# Patient Record
Sex: MALE | Race: WHITE | Employment: UNEMPLOYED | RURAL
[De-identification: names, ages, dates, MRNs, and addresses within clinical notes are randomized per-mention and may not be internally consistent; named-entity substitution may affect disease eponyms.]

---

## 2019-01-08 ENCOUNTER — OFFICE VISIT (OUTPATIENT)
Dept: FAMILY MEDICINE CLINIC | Age: 59
End: 2019-01-08

## 2019-01-08 VITALS
DIASTOLIC BLOOD PRESSURE: 70 MMHG | HEART RATE: 80 BPM | BODY MASS INDEX: 24.77 KG/M2 | OXYGEN SATURATION: 98 % | HEIGHT: 70 IN | WEIGHT: 173 LBS | TEMPERATURE: 97.7 F | SYSTOLIC BLOOD PRESSURE: 100 MMHG | RESPIRATION RATE: 18 BRPM

## 2019-01-08 DIAGNOSIS — F41.8 ANXIETY WITH DEPRESSION: ICD-10-CM

## 2019-01-08 DIAGNOSIS — R10.13 DYSPEPSIA: ICD-10-CM

## 2019-01-08 DIAGNOSIS — F41.0 PANIC ATTACKS: ICD-10-CM

## 2019-01-08 DIAGNOSIS — Z76.89 ENCOUNTER TO ESTABLISH CARE: ICD-10-CM

## 2019-01-08 DIAGNOSIS — J06.9 URI WITH COUGH AND CONGESTION: Primary | ICD-10-CM

## 2019-01-08 RX ORDER — TRAZODONE HYDROCHLORIDE 50 MG/1
50 TABLET ORAL
COMMUNITY
End: 2019-04-08 | Stop reason: DRUGHIGH

## 2019-01-08 RX ORDER — HYDROGEN PEROXIDE 3 %
20 SOLUTION, NON-ORAL MISCELLANEOUS DAILY
COMMUNITY

## 2019-01-08 RX ORDER — CITALOPRAM 20 MG/1
20 TABLET, FILM COATED ORAL DAILY
COMMUNITY
End: 2019-04-08 | Stop reason: SDUPTHER

## 2019-01-08 RX ORDER — LORAZEPAM 2 MG/1
2 TABLET ORAL 2 TIMES DAILY
COMMUNITY
End: 2019-01-08 | Stop reason: SDUPTHER

## 2019-01-08 RX ORDER — LORAZEPAM 2 MG/1
2 TABLET ORAL 2 TIMES DAILY
Qty: 60 TAB | Refills: 2 | Status: SHIPPED | OUTPATIENT
Start: 2019-01-08 | End: 2019-04-08 | Stop reason: SDUPTHER

## 2019-01-08 RX ORDER — AZITHROMYCIN 250 MG/1
TABLET, FILM COATED ORAL
Qty: 6 TAB | Refills: 0 | Status: SHIPPED | OUTPATIENT
Start: 2019-01-08 | End: 2019-01-13

## 2019-01-08 NOTE — PROGRESS NOTES
Identified pt with two pt identifiers(name and ). Chief Complaint Patient presents with [address] [place] [address] [address] PCP [address]. Bernardo Gabriel at [facility] ([address])  Fatigue Symptoms began about 5 weeks ago  Nasal Congestion  Cough Health Maintenance Due Topic  Hepatitis C Screening  DTaP/Tdap/Td series (1 - Tdap)  Shingrix Vaccine Age 50> (1 of 2)  FOBT Q 1 YEAR AGE 50-75 Wt Readings from Last 3 Encounters:  
19 173 lb (78.5 kg) 16 163 lb (73.9 kg) Temp Readings from Last 3 Encounters:  
19 97.7 °F (36.5 °C) (Oral) 16 97.6 °F (36.4 °C) BP Readings from Last 3 Encounters:  
19 100/70  
16 120/72 Pulse Readings from Last 3 Encounters:  
19 80  
16 70 Learning Assessment: 
:  
 
Learning Assessment 2019 PRIMARY LEARNER Patient PRIMARY LANGUAGE ENGLISH  
LEARNER PREFERENCE PRIMARY DEMONSTRATION  
ANSWERED BY self RELATIONSHIP SELF Depression Screening: 
:  
 
PHQ over the last two weeks 2019 PHQ Not Done Active Diagnosis of Depression or Bipolar Disorder Fall Risk Assessment: 
:  
 
No flowsheet data found. Abuse Screening: 
:  
 
Abuse Screening Questionnaire 2019 Do you ever feel afraid of your partner? Iva Starmario Are you in a relationship with someone who physically or mentally threatens you? Iva Starmario Is it safe for you to go home? Yonis Barnes Coordination of Care Questionnaire: 
:  
 
1) Have you been to an emergency room, urgent care clinic since your last visit? no  
Hospitalized since your last visit? no          
 
2) Have you seen or consulted any other health care providers outside of [address] [place] [address] since your last visit? yes Dr. Trina Piña [facility]. (Include any pap smears or colon screenings in this section.) 3) Do you have an Advance Directive on file?  no 
 Are you interested in receiving information about Advance Directives? no 
 
 
Reviewed record in preparation for visit and have obtained necessary documentation. Medication reconciliation up to date and corrected with patient at this time.

## 2019-01-08 NOTE — PATIENT INSTRUCTIONS
A Healthy Lifestyle: Care Instructions Your Care Instructions A healthy lifestyle can help you feel good, stay at a healthy weight, and have plenty of energy for both work and play. A healthy lifestyle is something you can share with your whole family. A healthy lifestyle also can lower your risk for serious health problems, such as high blood pressure, heart disease, and diabetes. You can follow a few steps listed below to improve your health and the health of your family. Follow-up care is a key part of your treatment and safety. Be sure to make and go to all appointments, and call your doctor if you are having problems. It's also a good idea to know your test results and keep a list of the medicines you take. How can you care for yourself at home? · Do not eat too much sugar, fat, or fast foods. You can still have dessert and treats now and then. The goal is moderation. · Start small to improve your eating habits. Pay attention to portion sizes, drink less juice and soda pop, and eat more fruits and vegetables. ? Eat a healthy amount of food. A 3-ounce serving of meat, for example, is about the size of a deck of cards. Fill the rest of your plate with vegetables and whole grains. ? Limit the amount of soda and sports drinks you have every day. Drink more water when you are thirsty. ? Eat at least 5 servings of fruits and vegetables every day. It may seem like a lot, but it is not hard to reach this goal. A serving or helping is 1 piece of fruit, 1 cup of vegetables, or 2 cups of leafy, raw vegetables. Have an apple or some carrot sticks as an afternoon snack instead of a candy bar. Try to have fruits and/or vegetables at every meal. 
· Make exercise part of your daily routine. You may want to start with simple activities, such as walking, bicycling, or slow swimming. Try to be active 30 to 60 minutes every day.  You do not need to do all 30 to 60 minutes all at once. For example, you can exercise 3 times a day for 10 or 20 minutes. Moderate exercise is safe for most people, but it is always a good idea to talk to your doctor before starting an exercise program. 
· Keep moving. Emilie Pour the lawn, work in the garden, or "[employer]. Take the stairs instead of the elevator at work. · If you smoke, quit. People who smoke have an increased risk for heart attack, stroke, cancer, and other lung illnesses. Quitting is hard, but there are ways to boost your chance of quitting tobacco for good. ? Use nicotine gum, patches, or lozenges. ? Ask your doctor about stop-smoking programs and medicines. ? Keep trying. In addition to reducing your risk of diseases in the future, you will notice some benefits soon after you stop using tobacco. If you have shortness of breath or asthma symptoms, they will likely get better within a few weeks after you quit. · Limit how much alcohol you drink. Moderate amounts of alcohol (up to 2 drinks a day for men, 1 drink a day for women) are okay. But drinking too much can lead to liver problems, high blood pressure, and other health problems. Family health If you have a family, there are many things you can do together to improve your health. · Eat meals together as a family as often as possible. · Eat healthy foods. This includes fruits, vegetables, lean meats and dairy, and whole grains. · Include your family in your fitness plan. Most people think of activities such as jogging or tennis as the way to fitness, but there are many ways you and your family can be more active. Anything that makes you breathe hard and gets your heart pumping is exercise. Here are some tips: 
? Walk to do errands or to take your child to school or the bus. 
? Go for a family bike ride after dinner instead of watching TV. Where can you learn more? Go to http://blaze-marybel.info/. Enter P846 in the search box to learn more about \"A Healthy Lifestyle: Care Instructions. \" Current as of: December 7, 2017 Content Version: 11.8 © 4662-2788 [employer]wise, [employer]. Care instructions adapted under license by [employer] (which disclaims liability or warranty for this information). If you have questions about a medical condition or this instruction, always ask your healthcare professional. [place] [address] any warranty or liability for your use of this information.

## 2019-01-08 NOTE — LETTER
1/8/2019 2:08 PM 
 
[facility] [address] [facility] [facility] [id number] To Whom It May Concern,  
 
Mr. Ronn Taylor is currently prescribed the following medications:  
 
Current Outpatient Medications:  
  citalopram (CELEXA) 20 mg tablet, Take 20 mg by mouth daily. , Disp: , Rfl:  
  traZODone (DESYREL) 50 mg tablet, Take 50 mg by mouth nightly., Disp: , Rfl:  
  esomeprazole (NEXIUM) 20 mg capsule, Take 20 mg by mouth daily. , Disp: , Rfl:  
  azithromycin (ZITHROMAX) 250 mg tablet, Take 2 tablets today, then take 1 tablet daily, Disp: 6 Tab, Rfl: 0 
  LORazepam (ATIVAN) 2 mg tablet, Take 1 Tab by mouth two (2) times a day. Max Daily Amount: 4 mg., Disp: 60 Tab, Rfl: 2 
  BUPROPION HCL (WELLBUTRIN PO), Take 150 mg by mouth two (2) times a day., Disp: , Rfl:  
 
 
Sincerely, Joni Pruitt MD

## 2019-01-08 NOTE — PROGRESS NOTES
Subjective:  
  
Manas Cantrell is a 63 y.o. male new patient here with complaint of \"I'm just not feeling well\". Onset was 5 weeks ago. He reports fatigue, productive cough, hot and cold spells. Positive sick contacts. Appetite has been decreased but he has been drinking well. Denies dyspnea, nausea, vomiting, diarrhea, postnasal discharge. Evaluation to date: none. Treatment to date: OTC cold medication with minimal effectiveness noted. He has missed work due to his symptoms. Current Outpatient Medications Medication Sig Dispense Refill  citalopram (CELEXA) 20 mg tablet Take 20 mg by mouth daily.  traZODone (DESYREL) 50 mg tablet Take 50 mg by mouth nightly.  LORazepam (ATIVAN) 2 mg tablet Take 2 mg by mouth two (2) times a day.  esomeprazole (NEXIUM) 20 mg capsule Take 20 mg by mouth daily.  BUPROPION HCL (WELLBUTRIN PO) Take 150 mg by mouth two (2) times a day. Allergies Allergen Reactions  Avis Swelling  Peanut Swelling Past medical history - reviewed. Past Medical History:  
Diagnosis Date  Anxiety with depression  Clinical depression  Dyspepsia  History of pneumonia 2016  Panic attacks Social history - reviewed. Social History Tobacco Use  Smoking status: Never Smoker  Smokeless tobacco: Never Used Substance Use Topics  Alcohol use: No  
  
 
Family history - reviewed. Family History Problem Relation Age of Onset  Diabetes Mother  Hypertension Mother  Cancer Father   
     unknown Review of Systems Pertinent items are noted in HPI. Objective:  
 
Visit Vitals /70 (BP 1 Location: Right arm, BP Patient Position: Sitting) Comment: Manual  
Pulse 80 Temp 97.7 °F (36.5 °C) (Oral) Comment: . Resp 18 Ht 5' 10\" (1.778 m) Wt 173 lb (78.5 kg) SpO2 98% BMI 24.82 kg/m² General appearance - alert, well appearing, and in no distress Eyes - pupils equal and reactive, extraocular eye movements intact, sclera anicteric Ears - bilateral TM's and external ear canals normal 
Nasal exam - normal and patent, no erythema, discharge or polyps Oropharyngx - mucous membranes moist, pharynx normal without lesions Neck - bilateral symmetric anterior adenopathy Chest - clear to auscultation, no wheezes, rales or rhonchi, symmetric air entry, no tachypnea, retractions or cyanosis Heart - normal rate, regular rhythm, normal S1, S2, no murmurs, rubs, clicks or gallops Abdomen - soft, nontender, nondistended, no masses or organomegaly, bowel sounds normal 
Neurologic - alert, oriented, normal speech, no focal findings or movement disorder noted Mental Status - alert, oriented to person, place, and time, normal mood, behavior, speech, dress, motor activity, and thought processes Assessment/Plan:  
Rosibel Villanueva is a 63 y.o. male seen for: 1. URI with cough and congestion 
- azithromycin (ZITHROMAX) 250 mg tablet; Take 2 tablets today, then take 1 tablet daily  Dispense: 6 Tab; Refill: 0 
- push fluids, OTC cough medication of choice 2. Anxiety with depression: prescription monitoring reviewed and appropriate. Continue with Celexa and Wellbutrin.  
- LORazepam (ATIVAN) 2 mg tablet; Take 1 Tab by mouth two (2) times a day. Max Daily Amount: 4 mg. Dispense: 60 Tab; Refill: 2 3. Panic attacks - LORazepam (ATIVAN) 2 mg tablet; Take 1 Tab by mouth two (2) times a day. Max Daily Amount: 4 mg. Dispense: 60 Tab; Refill: 2 4. Dyspepsia: continue Nexium. 5. Encounter to establish care: medical records requested. I have discussed the diagnosis with the patient and the intended plan as seen in the above orders. The patient has received an after-visit summary and questions were answered concerning future plans. I have discussed medication side effects and warnings with the patient as well.  Patient verbalizes understanding of plan of care and denies further questions or concerns at this time. Informed patient to return to the office if symptoms worsen or if new symptoms arise. Follow-up Disposition: 
Return in about 3 months (around 4/8/2019) for anxiety follow up or sooner as needed.

## 2019-01-15 ENCOUNTER — TELEPHONE (OUTPATIENT)
Dept: FAMILY MEDICINE CLINIC | Age: 59
End: 2019-01-15

## 2019-01-15 NOTE — TELEPHONE ENCOUNTER
The pt is feeling like he is still experiencing symptoms he felt the z-pack helped a lot but just wasn't long enough to fully kick the whole thing, he did return to work but is still having a hard time.

## 2019-01-16 NOTE — TELEPHONE ENCOUNTER
Patient called at home number, but patient currently not available. Call placed to mobile number with no answer. LVM message asking that he return my phone call.

## 2019-03-14 ENCOUNTER — HOSPITAL ENCOUNTER (EMERGENCY)
Age: 59
Discharge: HOME | End: 2019-03-14
Attending: EMERGENCY MEDICINE | Admitting: EMERGENCY MEDICINE
Payer: COMMERCIAL

## 2019-03-14 VITALS
RESPIRATION RATE: 16 BRPM | BODY MASS INDEX: 24.77 KG/M2 | WEIGHT: 173 LBS | HEIGHT: 70 IN | HEART RATE: 79 BPM | OXYGEN SATURATION: 97 % | SYSTOLIC BLOOD PRESSURE: 111 MMHG | DIASTOLIC BLOOD PRESSURE: 83 MMHG | TEMPERATURE: 98.3 F

## 2019-03-14 DIAGNOSIS — Z23 NEED FOR RABIES VACCINATION: ICD-10-CM
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
## 2019-03-14 PROCEDURE — 74011250636 HC RX REV CODE- 250/636: Performed by: EMERGENCY MEDICINE

## 2019-03-14 PROCEDURE — 99284 EMERGENCY DEPT VISIT MOD MDM: CPT

## 2019-03-14 PROCEDURE — 90471 IMMUNIZATION ADMIN: CPT

## 2019-03-14 PROCEDURE — 96372 THER/PROPH/DIAG INJ SC/IM: CPT

## 2019-03-14 PROCEDURE — 90715 TDAP VACCINE 7 YRS/> IM: CPT | Performed by: EMERGENCY MEDICINE

## 2019-03-14 PROCEDURE — 90375 RABIES IG IM/SC: CPT | Performed by: EMERGENCY MEDICINE

## 2019-03-14 PROCEDURE — 90675 RABIES VACCINE IM: CPT | Performed by: EMERGENCY MEDICINE

## 2019-03-14 RX ORDER — AMOXICILLIN AND CLAVULANATE POTASSIUM 875; 125 MG/1; MG/1
1 TABLET, FILM COATED ORAL 2 TIMES DAILY
Qty: 10 TAB | Refills: 0 | Status: SHIPPED | OUTPATIENT
Start: 2019-03-14 | End: 2019-04-08 | Stop reason: ALTCHOICE

## 2019-03-14 RX ADMIN — RABIES IMMUNE GLOBULIN (HUMAN) 1560 UNITS: 300 INJECTION, SOLUTION INFILTRATION; INTRAMUSCULAR at 15:34

## 2019-03-14 RX ADMIN — TETANUS TOXOID, REDUCED DIPHTHERIA TOXOID AND ACELLULAR PERTUSSIS VACCINE, ADSORBED 0.5 ML: 5; 2.5; 8; 8; 2.5 SUSPENSION INTRAMUSCULAR at 15:30

## 2019-03-14 RX ADMIN — RABIES VACCINE 2.5 UNITS: KIT at 15:31

## 2019-03-14 NOTE — DISCHARGE INSTRUCTIONS
Patient Education        Animal Bites: Care Instructions  Your Care Instructions  After an animal bite, the biggest concern is infection. The chance of infection depends on the type of animal that bit you, where on your body you were bitten, and your general health. Many animal bites are not closed with stitches, because this can increase the chance of infection. Your bite may take as little as 7 days or as long as several months to heal, depending on how bad it is. Taking good care of your wound at home will help it heal and reduce your chance of infection. The doctor has checked you carefully, but problems can develop later. If you notice any problems or new symptoms, get medical treatment right away. Follow-up care is a key part of your treatment and safety. Be sure to make and go to all appointments, and call your doctor if you are having problems. It's also a good idea to know your test results and keep a list of the medicines you take. How can you care for yourself at home? · If your doctor told you how to care for your wound, follow your doctor's instructions. If you did not get instructions, follow this general advice:  ? After 24 to 48 hours, gently wash the wound with clean water 2 times a day. Do not scrub or soak the wound. Don't use hydrogen peroxide or alcohol, which can slow healing. ? You may cover the wound with a thin layer of petroleum jelly, such as Vaseline, and a nonstick bandage. ? Apply more petroleum jelly and replace the bandage as needed. · After you shower, gently dry the wound with a clean towel. · If your doctor has closed the wound, cover the bandage with a plastic bag before you take a shower. · A small amount of skin redness and swelling around the wound edges and the stitches or staples is normal. Your wound may itch or feel irritated. Do not scratch or rub the wound.   · Ask your doctor if you can take an over-the-counter pain medicine, such as acetaminophen (Tylenol), ibuprofen (Advil, Motrin), or naproxen (Aleve). Read and follow all instructions on the label. · Do not take two or more pain medicines at the same time unless the doctor told you to. Many pain medicines have acetaminophen, which is Tylenol. Too much acetaminophen (Tylenol) can be harmful. · If your bite puts you at risk for rabies, you will get a series of shots over the next few weeks to prevent rabies. Your doctor will tell you when to get the shots. It is very important that you get the full cycle of shots. Follow your doctor's instructions exactly. · You may need a tetanus shot if you have not received one in the last 5 years. · If your doctor prescribed antibiotics, take them as directed. Do not stop taking them just because you feel better. You need to take the full course of antibiotics. When should you call for help? Call your doctor now or seek immediate medical care if:    · The skin near the bite turns cold or pale or it changes color.     · You lose feeling in the area near the bite, or it feels numb or tingly.     · You have trouble moving a limb near the bite.     · You have symptoms of infection, such as:  ? Increased pain, swelling, warmth, or redness near the wound. ? Red streaks leading from the wound. ? Pus draining from the wound. ? A fever.     · Blood soaks through the bandage. Oozing small amounts of blood is normal.     · Your pain is getting worse.    Watch closely for changes in your health, and be sure to contact your doctor if you are not getting better as expected. Where can you learn more? Go to http://blaze-marybel.info/. Enter E628 in the search box to learn more about \"Animal Bites: Care Instructions. \"  Current as of: September 23, 2018  Content Version: 11.9  © 3958-3697 [employer]. Care instructions adapted under license by [employer] (which disclaims liability or warranty for this information).  If you have questions about a medical condition or this instruction, always ask your healthcare professional. [place] [address] any warranty or liability for your use of this information. Patient Education   Rabies Vaccine (By injection)   Rabies Vaccine (RAYGavinbeejudy VAX-een)  Prevents infection caused by rabies virus. The vaccine can be given before or after you are exposed to the rabies virus. Brand Name(s): Imovax Rabies, RabAvert   There may be other brand names for this medicine. When This Medicine Should Not Be Used: You should not receive a rabies vaccine if you have had an allergic reaction to it before. How to Use This Medicine:   Injectable  · Your doctor will prescribe your exact dose and tell you how often it should be given. This medicine is given as a shot into one of your muscles. The vaccine is injected into the upper arm muscle. Very young or small children may have the vaccine injected into the upper leg muscle. · A nurse or other health provider will give you this medicine. · You are at risk for exposure to the rabies virus if you work with animals or will be going to a country where rabies is common. People who are at risk of being exposed to rabies will receive 3 doses on 3 different days within a 1-month period. · If you have received the vaccine in the past and have been exposed to the rabies virus, you will need to receive 2 doses on 2 different days within a 1-month period. · If you have not yet received the vaccine and were exposed to the rabies virus, you will need a total of 5 doses on 5 different days within a 1-month period. You will also receive a shot of rabies immune globulin. · It is very important that you have the shots on the exact day your doctor tells you to. If a dose is missed:   · You must use this medicine on a fixed schedule. Call your doctor or pharmacist if you miss a dose.   Drugs and Foods to Avoid:   Ask your doctor or pharmacist before using any other medicine, including over-the-counter medicines, vitamins, and herbal products. · Tell your doctor before you receive this vaccine if you take medicine that weakens your immune system, such as a steroid (such as dexamethasone, hydrocortisone, methylprednisolone, prednisolone, prednisone, Medrol®) or cancer treatment. Warnings While Using This Medicine:   · Make sure your doctor knows if you are pregnant or breastfeeding. Tell your doctor if you have had an allergic reaction to any type of vaccine, if you have an illness with fever, or if you have an immune system problem. · This medicine is made from donated human blood. Some human blood products have transmitted viruses to people who have received them, although the risk is low. Human donors and donated blood are both tested for viruses to keep the transmission risk low. Talk with your médico about this risk if you are concerned. · Your doctor will do lab tests at regular visits to check on the effects of this medicine. Keep all appointments. Possible Side Effects While Using This Medicine:   Call your doctor right away if you notice any of these side effects:  · Allergic reaction: Itching or hives, swelling in your face or hands, swelling or tingling in your mouth or throat, chest tightness, trouble breathing  · Muscle pain, stiffness, or weakness  · Numbness, tingling, or burning pain in your hands, arms, legs, or feet  If you notice these less serious side effects, talk with your doctor:   · Dizziness, headache  · Fever  · Itching, pain, redness, or swelling where the shot was given  · Nausea, stomach pain  · Tiredness  If you notice other side effects that you think are caused by this medicine, tell your doctor. Call your doctor for medical advice about side effects.  You may report side effects to FDA at 6-857-FDA-4968  © 2017 [facility] Information is for End User's use only and may not be sold, redistributed or otherwise used for commercial purposes. The above information is an  only. It is not intended as medical advice for individual conditions or treatments. Talk to your doctor, nurse or pharmacist before following any medical regimen to see if it is safe and effective for you.

## 2019-03-14 NOTE — ED TRIAGE NOTES
Pt ambulatory to treatment area with c/o \"about 40 mins ago we were trying to catch our cat and he attacked me on my arms. \"  Multiple scratches and punctures noted to pt's left and right arms. Pt reports the cat is unvaccinated. Pt is unsure of his last tetanus.

## 2019-03-15 NOTE — ED PROVIDER NOTES
Hx anxiety/depression; presents after being bitten (right forearm) and scratched (left forearm) by a \"partially feral\" cat; occurred just prior to arrival; he was trying to catch the cat so that the cat could get medical assistance; he is confident the cat is not up to date on its vaccinations. Pain is moderate. Bleeding controlled. No other injuries or complaints. Past Medical History:   Diagnosis Date    Anxiety with depression     Clinical depression     Dyspepsia     History of pneumonia 2016    Panic attacks        Past Surgical History:   Procedure Laterality Date    HX HEENT      right eye, small growth removal         Family History:   Problem Relation Age of Onset    Diabetes Mother     Hypertension Mother     Cancer Father         unknown       Social History     Socioeconomic History    Marital status: SINGLE     Spouse name: Not on file    Number of children: Not on file    Years of education: Not on file    Highest education level: Not on file   Social Needs    Financial resource strain: Not on file    Food insecurity - worry: Not on file    Food insecurity - inability: Not on file   [employer] needs - medical: Not on file   [employer] needs - non-medical: Not on file   Occupational History    Not on file   Tobacco Use    Smoking status: Never Smoker    Smokeless tobacco: Never Used   Substance and Sexual Activity    Alcohol use: No    Drug use: Yes     Types: Marijuana    Sexual activity: Not on file   Other Topics Concern    Not on file   Social History Narrative    Not on file         ALLERGIES: Erwin and Peanut    Review of Systems   All other systems reviewed and are negative.       Vitals:    03/14/19 1500 03/14/19 1515 03/14/19 1530 03/14/19 1545   BP: 126/73 114/84 126/90 111/83   Pulse:       Resp:       Temp:       SpO2: 95% 97% 96% 97%   Weight:       Height:                Physical Exam   Constitutional: He appears well-developed and well-nourished. HENT:   Head: Normocephalic and atraumatic. Eyes: Conjunctivae are normal.   Neck: No tracheal deviation present. Cardiovascular: Normal rate. Pulmonary/Chest: Effort normal.   Abdominal: He exhibits no distension. Musculoskeletal: He exhibits no edema. 4 puncture wounds right forearm (suspect from cat's canine teeth); scratches noted left forearm - there is also a hematoma where the scratch appears to have affected a superficial vein. Neurological: He is alert. Skin: Skin is dry. Psychiatric: He has a normal mood and affect. Nursing note and vitals reviewed. MDM       Procedures    A/P: \"partially feral\" cat bite/scratches - cat not thought to be up to date on vaccinations; reassuring appearance and exam; VSS;   given rabies immunoglobulin and vaccine #1; home with Augmentin; follow-up vaccines on days 3, 7, 14.   Estephania Castellanos MD

## 2019-03-18 ENCOUNTER — HOSPITAL ENCOUNTER (EMERGENCY)
Age: 59
Discharge: HOME | End: 2019-03-18
Attending: EMERGENCY MEDICINE
Payer: COMMERCIAL

## 2019-03-18 VITALS
RESPIRATION RATE: 15 BRPM | OXYGEN SATURATION: 98 % | HEART RATE: 69 BPM | WEIGHT: 173 LBS | BODY MASS INDEX: 24.77 KG/M2 | TEMPERATURE: 97.9 F | SYSTOLIC BLOOD PRESSURE: 120 MMHG | DIASTOLIC BLOOD PRESSURE: 75 MMHG | HEIGHT: 70 IN

## 2019-03-18 DIAGNOSIS — Z23 NEED FOR RABIES VACCINATION: Primary | ICD-10-CM

## 2019-03-18 PROCEDURE — 99282 EMERGENCY DEPT VISIT SF MDM: CPT

## 2019-03-18 PROCEDURE — 74011250636 HC RX REV CODE- 250/636: Performed by: PHYSICIAN ASSISTANT

## 2019-03-18 PROCEDURE — 90471 IMMUNIZATION ADMIN: CPT

## 2019-03-18 PROCEDURE — 90675 RABIES VACCINE IM: CPT | Performed by: PHYSICIAN ASSISTANT

## 2019-03-18 RX ADMIN — RABIES VACCINE 2.5 UNITS: KIT at 16:06

## 2019-03-18 NOTE — ED NOTES
Next rabies injection hard copy order sent with patient and a copy was faxed to [facility]. To follow up on Thursday.  Copy also scanned into chart

## 2019-03-18 NOTE — ED NOTES
The patient was discharged home by Luisito Syed in stable condition. The patient is alert and oriented, in no respiratory distress. The patient's diagnosis, condition and treatment were explained. The patient expressed understanding. A discharge plan has been developed. A  was not involved in the process. Aftercare instructions were given. Pt ambulatory out of the ED.

## 2019-03-18 NOTE — ED PROVIDER NOTES
64 y/o male with PMHx of dyspepsia, anxiety and depression, presenting for rabies vaccine. The patient states that he was seen here in the ED 4 days ago after his \"partially feral\" cat bit his right forearm and scratched his left forearm. He was given the rabies vaccine and Ig, and was prescribed Augmentin which he has been taking. He was instructed to follow up at the infusion center yesterday for his next rabies vaccine, but was unable to go. He denies any pain at this time, and states that the abrasions on his arms are healing well. No fevers, chills, redness or swelling. The history is provided by the patient. Past Medical History:   Diagnosis Date    Anxiety with depression     Clinical depression     Dyspepsia     History of pneumonia 2016    Panic attacks        Past Surgical History:   Procedure Laterality Date    HX HEENT      right eye, small growth removal         Family History:   Problem Relation Age of Onset    Diabetes Mother     Hypertension Mother     Cancer Father         unknown       Social History     Socioeconomic History    Marital status: SINGLE     Spouse name: Not on file    Number of children: Not on file    Years of education: Not on file    Highest education level: Not on file   Social Needs    Financial resource strain: Not on file    Food insecurity - worry: Not on file    Food insecurity - inability: Not on file   [email or web address] needs - medical: Not on file   [email or web address] needs - non-medical: Not on file   Occupational History    Not on file   Tobacco Use    Smoking status: Never Smoker    Smokeless tobacco: Never Used   Substance and Sexual Activity    Alcohol use: No    Drug use: Yes     Types: Marijuana    Sexual activity: Not on file   Other Topics Concern    Not on file   Social History Narrative    Not on file         ALLERGIES: [place] and [place]    Review of Systems   Constitutional: Negative for chills and fever.    Respiratory: Negative for shortness of breath. Gastrointestinal: Negative for nausea and vomiting. Musculoskeletal: Negative for arthralgias and myalgias. Skin: Positive for wound (abrasions on arms). Neurological: Negative for syncope and light-headedness. All other systems reviewed and are negative. Vitals:    03/18/19 1522   BP: 117/78   Pulse: 70   Resp: 14   Temp: 97.9 °F (36.6 °C)   SpO2: 97%   Weight: 78.5 kg (173 lb)   Height: 5' 10\" (1.778 m)            Physical Exam   Constitutional: He is oriented to person, place, and time. He appears well-developed and well-nourished. No distress. HENT:   Head: Normocephalic and atraumatic. Eyes: Conjunctivae and EOM are normal.   Cardiovascular: Normal rate, regular rhythm and normal heart sounds. Pulses:       Radial pulses are 2+ on the right side, and 2+ on the left side. Pulmonary/Chest: Effort normal and breath sounds normal.   Neurological: He is alert and oriented to person, place, and time. Skin: Skin is warm and dry. He is not diaphoretic. Bilateral forearms with scattered abrasions, healing well, without associated swelling, erythema, induration or tenderness to palpation. Nursing note and vitals reviewed. MDM  Number of Diagnoses or Management Options  Need for rabies vaccination:      Amount and/or Complexity of Data Reviewed  Discuss the patient with other providers: yes (Dr. Eden Wiseman, ED attending)    Patient Progress  Patient progress: stable         Procedures      62 y/o male with PMHx of dyspepsia, anxiety and depression, presenting for rabies vaccine. Wounds healing well without evidence of infection. Rabies vaccine administered. Patient given instructions for outpatient infusion center follow up for subsequent vaccines. Strict ED return precautions discussed and provided in writing at time of discharge. The patient verbalized understanding and agreement with this plan.

## 2019-03-18 NOTE — DISCHARGE INSTRUCTIONS
Patient Education        Rabies Vaccine: What You Need to Know  What is rabies? Rabies is a serious disease. It is caused by a virus. Rabies is mainly a disease of animals. Humans get rabies when they are bitten by infected animals. At first there might not be any symptoms. But weeks, or even months after a bite, rabies can cause pain, fatigue, headaches, fever, and irritability. These are followed by seizures, hallucinations, and paralysis. Human rabies is almost always fatal.  Wild animals--especially bats--are the most common source of human rabies infection in the [place]. Skunks, raccoons, dogs, cats, coyotes, foxes and other mammals can also transmit the disease. Human rabies is rare in the [place]. There have been only 54 cases diagnosed since 1990. However, between 16,000 and 39,000 people are vaccinated each year as a precaution after animal bites. Also, rabies is far more common in other parts of the world, with about 40,000 -70,000 rabies-related deaths worldwide each year. Bites from unvaccinated dogs cause most of these cases. Rabies vaccine can prevent rabies. Rabies vaccine  Rabies vaccine is given to people at high risk of rabies to protect them if they are exposed. It can also prevent the disease if it is given to a person after they have been exposed. Rabies vaccine is made from killed rabies virus. It cannot cause rabies. Who should get rabies vaccine and when? Preventive vaccination (no exposure)  · People at high risk of exposure to rabies, such as veterinarians, animal handlers, rabies laboratory workers, spelunkers, and rabies biologics production workers should be offered rabies vaccine. · The vaccine should also be considered for:  ? People whose activities bring them into frequent contact with rabies virus or with possibly rabid animals. ? International travelers who are likely to come in contact with animals in parts of the world where rabies is common.   · The pre-exposure schedule for rabies vaccination is 3 doses, given at the following times:  ? Dose 1: As appropriate  ? Dose 2: 7 days after Dose 1  ? Dose 3: 21 days or 28 days after Dose 1  For laboratory workers and others who may be repeatedly exposed to rabies virus, periodic testing for immunity is recommended, and booster doses should be given as needed. (Testing or booster doses are not recommended for travelers.) Ask your doctor for details. Vaccination After an Exposure  Anyone who has been bitten by an animal, or who otherwise may have been exposed to rabies, should clean the wound and see a doctor immediately. The doctor will determine if they need to be vaccinated. A person who is exposed and has never been vaccinated against rabies should get 4 doses of rabies vaccine--one dose right away, and additional doses on the 3rd, 7th, and 14th days. They should also get another shot called Rabies Immune Globulin at the same time as the first dose. A person who has been previously vaccinated should get 2 doses of rabies vaccine--one right away and another on the 3rd day. Rabies Immune Globulin is not needed. Tell your doctor if . . .  Talk with a doctor before getting rabies vaccine if you:  1. Ever had a serious (life-threatening) allergic reaction to a previous dose of rabies vaccine, or to any component of the vaccine; tell your doctor if you have any severe allergies. 2. Have a weakened immune system because of:  ? HIV/AIDS or another disease that affects the immune system. ? Treatment with drugs that affect the immune system, such as steroids. ? Cancer, or cancer treatment with radiation or drugs. If you have a minor illnesses, such as a cold, you can be vaccinated. If you are moderately or severely ill, you should probably wait until you recover before getting a routine (non-exposure) dose of rabies vaccine.   If you have been exposed to rabies virus, you should get the vaccine regardless of any other illnesses you may have. What are the risks from rabies vaccine? A vaccine, like any medicine, is capable of causing serious problems, such as severe allergic reactions. The risk of a vaccine causing serious harm, or death, is extremely small. Serious problems from rabies vaccine are very rare. Mild problems  · Soreness, redness, swelling, or itching where the shot was given (30%-74%)  · Headache, nausea, abdominal pain, muscle aches, dizziness (5%-40%)  Moderate problems  · Hives, pain in the joints, fever (about 6% of booster doses)  Other nervous system disorders, such as Guillain Barré syndrome (GBS), have been reported after rabies vaccine, but this happens so rarely that it is not known whether they are related to the vaccine. NOTE: Several brands of rabies vaccine are available in the [place], and reactions may vary between brands. Your provider can give you more information about a particular brand. What if there is a serious reaction? What should I look for? · Look for anything that concerns you, such as signs of a severe allergic reaction, very high fever, or behavior changes. Signs of a severe allergic reaction can include hives, swelling of the face and throat, difficulty breathing, a fast heartbeat, dizziness, and weakness. These would start a few minutes to a few hours after the vaccination. What should I do? · If you think it is a severe allergic reaction or other emergency that can't wait, call 9-1-1 or get the person to the nearest hospital. Otherwise, call your doctor. · Afterward, the reaction should be reported to the Vaccine Adverse Event Reporting System (VAERS). Your doctor might file this report, or you can do it yourself through the VAERS web site at www.vaers. hhs.gov, or by calling 6-614.131.6380. VAERS is only for reporting reactions. They do not give medical advice. How can I learn more? · Ask your doctor. · Call your local or state health department.   · Contact the Centers for Disease Control and Prevention (CDC):  ? Visit CDC's rabies website at www.cdc.gov/rabies/  Vaccine Information Statement  Rabies Vaccine  (10/6/2009)  Department of Health and Human Services  Centers for Disease Control and Prevention  Many Vaccine Information Statements are available in Nepali and other languages. See www.immunize.org/vis. Hojas de Informacián Sobre Vacunas están disponibles en Español y en muchos otros idiomas. Visite Daren.si. Care instructions adapted under license by [employer] (which disclaims liability or warranty for this information). If you have questions about a medical condition or this instruction, always ask your healthcare professional. [address] any warranty or liability for your use of this information.

## 2019-03-19 ENCOUNTER — PATIENT OUTREACH (OUTPATIENT)
Dept: FAMILY MEDICINE CLINIC | Age: 59
End: 2019-03-19

## 2019-03-20 NOTE — PROGRESS NOTES
3/20/2019 Patient called regarding getting rabies shot through infusion center. Called and faxed orders to them. Spoke with rep who was going to call and schedule appointment Alexandre Keen, [address]

## 2019-03-21 ENCOUNTER — HOSPITAL ENCOUNTER (OUTPATIENT)
Dept: INFUSION THERAPY | Age: 59
Discharge: HOME | End: 2019-03-21
Payer: COMMERCIAL

## 2019-03-21 VITALS
DIASTOLIC BLOOD PRESSURE: 74 MMHG | RESPIRATION RATE: 18 BRPM | SYSTOLIC BLOOD PRESSURE: 109 MMHG | TEMPERATURE: 97.8 F | HEART RATE: 78 BPM

## 2019-03-21 PROCEDURE — 90471 IMMUNIZATION ADMIN: CPT

## 2019-03-21 PROCEDURE — 74011250636 HC RX REV CODE- 250/636: Performed by: EMERGENCY MEDICINE

## 2019-03-21 PROCEDURE — 90675 RABIES VACCINE IM: CPT | Performed by: EMERGENCY MEDICINE

## 2019-03-21 PROCEDURE — 96372 THER/PROPH/DIAG INJ SC/IM: CPT

## 2019-03-21 RX ADMIN — RABIES VACCINE 2.5 UNITS: KIT at 18:00

## 2019-03-21 NOTE — PROGRESS NOTES
Outpatient Infusion Center - Chemotherapy Progress Note 6017 Pt admit to [facility] for Rabies injection/D7 ambulatory in stable condition. Assessment completed. No new concerns voiced. Visit Vitals /74 Pulse 78 Temp 97.8 °F (36.6 °C) Resp 18 Medications: 
Rabavert 2.5 units (IM L arm) 1805 Pt tolerated treatment well. D/c home ambulatory in no distress. Pt aware of next [facility] appointment scheduled for 03/28/2019.

## 2019-03-28 ENCOUNTER — APPOINTMENT (OUTPATIENT)
Dept: INFUSION THERAPY | Age: 59
End: 2019-03-28
Payer: COMMERCIAL

## 2019-03-28 ENCOUNTER — HOSPITAL ENCOUNTER (OUTPATIENT)
Dept: INFUSION THERAPY | Age: 59
Discharge: HOME | End: 2019-03-28
Payer: COMMERCIAL

## 2019-03-28 VITALS
RESPIRATION RATE: 18 BRPM | TEMPERATURE: 98.1 F | SYSTOLIC BLOOD PRESSURE: 111 MMHG | HEART RATE: 77 BPM | DIASTOLIC BLOOD PRESSURE: 77 MMHG

## 2019-03-28 PROCEDURE — 96372 THER/PROPH/DIAG INJ SC/IM: CPT

## 2019-03-28 PROCEDURE — 90471 IMMUNIZATION ADMIN: CPT

## 2019-03-28 PROCEDURE — 74011250636 HC RX REV CODE- 250/636: Performed by: EMERGENCY MEDICINE

## 2019-03-28 PROCEDURE — 90675 RABIES VACCINE IM: CPT | Performed by: EMERGENCY MEDICINE

## 2019-03-28 RX ADMIN — RABIES VACCINE 2.5 UNITS: KIT at 16:35

## 2019-03-28 NOTE — PROGRESS NOTES
Outpatient Infusion Center - Chemotherapy Progress Note 26 Pt admit to [facility] for Rabies injection/D14 ambulatory in stable condition. Assessment completed. No new concerns voiced. Visit Vitals /77 Pulse 77 Temp 98.1 °F (36.7 °C) Resp 18 Medications: 
Rabavert 2.5 units (IM R arm) 1645 Pt tolerated treatment well. D/c home ambulatory in no distress.

## 2019-04-08 ENCOUNTER — OFFICE VISIT (OUTPATIENT)
Dept: FAMILY MEDICINE CLINIC | Age: 59
End: 2019-04-08

## 2019-04-08 VITALS
RESPIRATION RATE: 18 BRPM | WEIGHT: 170 LBS | OXYGEN SATURATION: 97 % | BODY MASS INDEX: 24.34 KG/M2 | SYSTOLIC BLOOD PRESSURE: 124 MMHG | HEIGHT: 70 IN | TEMPERATURE: 99.5 F | HEART RATE: 96 BPM | DIASTOLIC BLOOD PRESSURE: 80 MMHG

## 2019-04-08 DIAGNOSIS — F41.8 ANXIETY WITH DEPRESSION: ICD-10-CM

## 2019-04-08 DIAGNOSIS — F41.0 PANIC ATTACKS: ICD-10-CM

## 2019-04-08 DIAGNOSIS — F51.05 INSOMNIA DUE TO OTHER MENTAL DISORDER: ICD-10-CM

## 2019-04-08 DIAGNOSIS — Z12.11 SCREENING FOR COLON CANCER: ICD-10-CM

## 2019-04-08 DIAGNOSIS — F41.8 ANXIETY WITH DEPRESSION: Primary | ICD-10-CM

## 2019-04-08 DIAGNOSIS — F99 INSOMNIA DUE TO OTHER MENTAL DISORDER: ICD-10-CM

## 2019-04-08 RX ORDER — TRAZODONE HYDROCHLORIDE 100 MG/1
100 TABLET ORAL
Qty: 30 TAB | Refills: 5 | Status: SHIPPED | OUTPATIENT
Start: 2019-04-08 | End: 2019-10-24 | Stop reason: SDUPTHER

## 2019-04-08 RX ORDER — BUPROPION HYDROCHLORIDE 150 MG/1
150 TABLET, EXTENDED RELEASE ORAL 2 TIMES DAILY
Qty: 60 TAB | Refills: 5 | Status: SHIPPED | OUTPATIENT
Start: 2019-04-08 | End: 2019-10-24 | Stop reason: SDUPTHER

## 2019-04-08 RX ORDER — LORAZEPAM 2 MG/1
2 TABLET ORAL 2 TIMES DAILY
Qty: 60 TAB | Refills: 2 | Status: CANCELLED | OUTPATIENT
Start: 2019-04-08

## 2019-04-08 RX ORDER — CITALOPRAM 20 MG/1
20 TABLET, FILM COATED ORAL DAILY
Qty: 30 TAB | Refills: 5 | Status: SHIPPED | OUTPATIENT
Start: 2019-04-08 | End: 2019-10-24 | Stop reason: SDUPTHER

## 2019-04-08 RX ORDER — LORAZEPAM 2 MG/1
2 TABLET ORAL 2 TIMES DAILY
Qty: 60 TAB | Refills: 2 | Status: SHIPPED | OUTPATIENT
Start: 2019-04-08 | End: 2019-07-16 | Stop reason: SDUPTHER

## 2019-04-08 NOTE — PROGRESS NOTES
Subjective:      Tino Vega is a 63 y.o. male here for anxiety follow up and medication refill. He reports that he has been doing well with current therapy. He does have intermittent panic attacks, but is typically able to manage. Denies SI/HI. Current Outpatient Medications   Medication Sig Dispense Refill    citalopram (CELEXA) 20 mg tablet Take 20 mg by mouth daily.  traZODone (DESYREL) 50 mg tablet Take 50 mg by mouth nightly.  esomeprazole (NEXIUM) 20 mg capsule Take 20 mg by mouth daily.  LORazepam (ATIVAN) 2 mg tablet Take 1 Tab by mouth two (2) times a day. Max Daily Amount: 4 mg. 60 Tab 2    BUPROPION HCL (WELLBUTRIN PO) Take 150 mg by mouth two (2) times a day. Allergies   Allergen Reactions    [place] Swelling    Peanut Swelling       Past Medical History:   Diagnosis Date    Anxiety with depression     Clinical depression     Dyspepsia     History of pneumonia 2016    Panic attacks        Social History     Tobacco Use    Smoking status: Never Smoker    Smokeless tobacco: Never Used   Substance Use Topics    Alcohol use: No        Review of Systems  Pertinent items are noted in HPI. Objective:     Visit Vitals  /80 (BP 1 Location: Right arm, BP Patient Position: Sitting) Comment: Manual   Pulse 96   Temp 99.5 °F (37.5 °C) (Oral) Comment: .   Resp 18   Ht 5' 10\" (1.778 m)   Wt 170 lb (77.1 kg)   SpO2 97%   BMI 24.39 kg/m²      General appearance - alert, well appearing, and in no distress, anxious in appearance  Chest - clear to auscultation, no wheezes, rales or rhonchi, symmetric air entry, no tachypnea, retractions or cyanosis  Heart - normal rate, regular rhythm, normal S1, S2, no murmurs, rubs, clicks or gallops  Mental Status: alert, oriented to person, place, and time, normal mood, behavior, speech, dress, motor activity, and thought processes    Assessment/Plan:   Tino Vega is a 63 y.o. male seen for:     1.  Anxiety with depression: stable, continue with current therapy. - LORazepam (ATIVAN) 2 mg tablet; Take 1 Tab by mouth two (2) times a day. Max Daily Amount: 4 mg. Dispense: 60 Tab; Refill: 2  - citalopram (CELEXA) 20 mg tablet; Take 1 Tab by mouth daily. Dispense: 30 Tab; Refill: 5  - buPROPion SR (WELLBUTRIN SR) 150 mg SR tablet; Take 1 Tab by mouth two (2) times a day. Dispense: 60 Tab; Refill: 5    2. Panic attacks  - LORazepam (ATIVAN) 2 mg tablet; Take 1 Tab by mouth two (2) times a day. Max Daily Amount: 4 mg. Dispense: 60 Tab; Refill: 2    3. Insomnia due to other mental disorder  - traZODone (DESYREL) 100 mg tablet; Take 1 Tab by mouth nightly. Dispense: 30 Tab; Refill: 5    4. Screening for colon cancer  - REFERRAL TO GASTROENTEROLOGY    I have discussed the diagnosis with the patient and the intended plan as seen in the above orders. The patient has received an after-visit summary and questions were answered concerning future plans. I have discussed medication side effects and warnings with the patient as well. Patient verbalizes understanding of plan of care and denies further questions or concerns at this time. Informed patient to return to the office if symptoms worsen or if new symptoms arise. Follow-up and Dispositions    · Return in about 3 months (around 7/8/2019) for anxiety follow up or sooner as needed.

## 2019-04-08 NOTE — PATIENT INSTRUCTIONS
[facility] of [address] [facility],[address]  [address] [facility], [address]  Phone: [phone] [address] of [facility] Office  [address] [facility]  [address]  Phone: [address]  [address] [facility], [address], [address]  Phone: [phone]             A Healthy Lifestyle: Care Instructions  Your Care Instructions    A healthy lifestyle can help you feel good, stay at a healthy weight, and have plenty of energy for both work and play. A healthy lifestyle is something you can share with your whole family. A healthy lifestyle also can lower your risk for serious health problems, such as high blood pressure, heart disease, and diabetes. You can follow a few steps listed below to improve your health and the health of your family. Follow-up care is a key part of your treatment and safety. Be sure to make and go to all appointments, and call your doctor if you are having problems. It's also a good idea to know your test results and keep a list of the medicines you take. How can you care for yourself at home? · Do not eat too much sugar, fat, or fast foods. You can still have dessert and treats now and then. The goal is moderation. · Start small to improve your eating habits. Pay attention to portion sizes, drink less juice and soda pop, and eat more fruits and vegetables. ? Eat a healthy amount of food. A 3-ounce serving of meat, for example, is about the size of a deck of cards. Fill the rest of your plate with vegetables and whole grains. ? Limit the amount of soda and sports drinks you have every day. Drink more water when you are thirsty. ? Eat at least 5 servings of fruits and vegetables every day. It may seem like a lot, but it is not hard to reach this goal. A serving or helping is 1 piece of fruit, 1 cup of vegetables, or 2 cups of leafy, raw vegetables.  Have an apple or some carrot sticks as an afternoon snack instead of a candy bar. Try to have fruits and/or vegetables at every meal.  · Make exercise part of your daily routine. You may want to start with simple activities, such as walking, bicycling, or slow swimming. Try to be active 30 to 60 minutes every day. You do not need to do all 30 to 60 minutes all at once. For example, you can exercise 3 times a day for 10 or 20 minutes. Moderate exercise is safe for most people, but it is always a good idea to talk to your doctor before starting an exercise program.  · Keep moving. Laith Bernsteinter the lawn, work in the garden, or [employer]ia. Take the stairs instead of the elevator at work. · If you smoke, quit. People who smoke have an increased risk for heart attack, stroke, cancer, and other lung illnesses. Quitting is hard, but there are ways to boost your chance of quitting tobacco for good. ? Use nicotine gum, patches, or lozenges. ? Ask your doctor about stop-smoking programs and medicines. ? Keep trying. In addition to reducing your risk of diseases in the future, you will notice some benefits soon after you stop using tobacco. If you have shortness of breath or asthma symptoms, they will likely get better within a few weeks after you quit. · Limit how much alcohol you drink. Moderate amounts of alcohol (up to 2 drinks a day for men, 1 drink a day for women) are okay. But drinking too much can lead to liver problems, high blood pressure, and other health problems. Family health  If you have a family, there are many things you can do together to improve your health. · Eat meals together as a family as often as possible. · Eat healthy foods. This includes fruits, vegetables, lean meats and dairy, and whole grains. · Include your family in your fitness plan. Most people think of activities such as jogging or tennis as the way to fitness, but there are many ways you and your family can be more active.  Anything that makes you breathe hard and gets your heart pumping is exercise. Here are some tips:  ? Walk to do errands or to take your child to school or the bus.  ? Go for a family bike ride after dinner instead of watching TV. Where can you learn more? Go to http://blaze-marybel.info/. Enter V320 in the search box to learn more about \"A Healthy Lifestyle: Care Instructions. \"  Current as of: September 11, 2018  Content Version: 11.9  © 3651-5702 [employer]. Care instructions adapted under license by [employer] (which disclaims liability or warranty for this information). If you have questions about a medical condition or this instruction, always ask your healthcare professional. [address] any warranty or liability for your use of this information.

## 2019-07-16 ENCOUNTER — OFFICE VISIT (OUTPATIENT)
Dept: FAMILY MEDICINE CLINIC | Age: 59
End: 2019-07-16

## 2019-07-16 VITALS
SYSTOLIC BLOOD PRESSURE: 126 MMHG | OXYGEN SATURATION: 98 % | TEMPERATURE: 98.6 F | HEART RATE: 78 BPM | WEIGHT: 165 LBS | DIASTOLIC BLOOD PRESSURE: 78 MMHG | HEIGHT: 70 IN | BODY MASS INDEX: 23.62 KG/M2 | RESPIRATION RATE: 18 BRPM

## 2019-07-16 DIAGNOSIS — F41.8 ANXIETY WITH DEPRESSION: Primary | ICD-10-CM

## 2019-07-16 DIAGNOSIS — F41.0 PANIC ATTACKS: ICD-10-CM

## 2019-07-16 RX ORDER — LORAZEPAM 2 MG/1
2 TABLET ORAL 2 TIMES DAILY
Qty: 60 TAB | Refills: 2 | Status: SHIPPED | OUTPATIENT
Start: 2019-07-16 | End: 2019-10-24 | Stop reason: SDUPTHER

## 2019-07-16 NOTE — PROGRESS NOTES
Identified pt with two pt identifiers(name and ). Chief Complaint   Patient presents with    Anxiety    Follow-up    Medication Refill     lorazapam        Health Maintenance Due   Topic    Hepatitis C Screening     Shingrix Vaccine Age 50> (1 of 2)    FOBT Q 1 YEAR AGE 50-75        Wt Readings from Last 3 Encounters:   19 165 lb (74.8 kg)   19 170 lb (77.1 kg)   19 173 lb (78.5 kg)     Temp Readings from Last 3 Encounters:   19 98.6 °F (37 °C) (Oral)   19 99.5 °F (37.5 °C) (Oral)   19 98.1 °F (36.7 °C)     BP Readings from Last 3 Encounters:   19 126/78   19 124/80   19 111/77     Pulse Readings from Last 3 Encounters:   19 78   19 96   19 77         Learning Assessment:  :     Learning Assessment 2019   PRIMARY LEARNER Patient   PRIMARY LANGUAGE ENGLISH   LEARNER PREFERENCE PRIMARY DEMONSTRATION   ANSWERED BY self   RELATIONSHIP SELF       Depression Screening:  :     3 most recent PHQ Screens 2019   PHQ Not Done Active Diagnosis of Depression or Bipolar Disorder       Fall Risk Assessment:  :     No flowsheet data found. Abuse Screening:  :     Abuse Screening Questionnaire 2019   Do you ever feel afraid of your partner? N   Are you in a relationship with someone who physically or mentally threatens you? N   Is it safe for you to go home? Y       Coordination of Care Questionnaire:  :     1) Have you been to an emergency room, urgent care clinic since your last visit? no   Hospitalized since your last visit? no             2) Have you seen or consulted any other health care providers outside of [address] [place] [address] since your last visit? no  (Include any pap smears or colon screenings in this section.)    3) Do you have an Advance Directive on file? no  Are you interested in receiving information about Advance Directives?  no    Reviewed record in preparation for visit and have obtained necessary documentation. Medication reconciliation up to date and corrected with patient at this time.

## 2019-07-16 NOTE — PATIENT INSTRUCTIONS
[facility] [address] [facility],[address]  [address] [facility], [address]  Phone: [phone] [address] Jos [facility] Office  [address] [facility]  Audra Spicer [address]  Phone: [address]  [address] [facility], [address] [place], [address]  Phone: [phone]         A Healthy Lifestyle: Care Instructions  Your Care Instructions    A healthy lifestyle can help you feel good, stay at a healthy weight, and have plenty of energy for both work and play. A healthy lifestyle is something you can share with your whole family. A healthy lifestyle also can lower your risk for serious health problems, such as high blood pressure, heart disease, and diabetes. You can follow a few steps listed below to improve your health and the health of your family. Follow-up care is a key part of your treatment and safety. Be sure to make and go to all appointments, and call your doctor if you are having problems. It's also a good idea to know your test results and keep a list of the medicines you take. How can you care for yourself at home? · Do not eat too much sugar, fat, or fast foods. You can still have dessert and treats now and then. The goal is moderation. · Start small to improve your eating habits. Pay attention to portion sizes, drink less juice and soda pop, and eat more fruits and vegetables. ? Eat a healthy amount of food. A 3-ounce serving of meat, for example, is about the size of a deck of cards. Fill the rest of your plate with vegetables and whole grains. ? Limit the amount of soda and sports drinks you have every day. Drink more water when you are thirsty. ? Eat at least 5 servings of fruits and vegetables every day. It may seem like a lot, but it is not hard to reach this goal. A serving or helping is 1 piece of fruit, 1 cup of vegetables, or 2 cups of leafy, raw vegetables.  Have an apple or some carrot sticks as an afternoon snack instead of a candy bar. Try to have fruits and/or vegetables at every meal.  · Make exercise part of your daily routine. You may want to start with simple activities, such as walking, bicycling, or slow swimming. Try to be active 30 to 60 minutes every day. You do not need to do all 30 to 60 minutes all at once. For example, you can exercise 3 times a day for 10 or 20 minutes. Moderate exercise is safe for most people, but it is always a good idea to talk to your doctor before starting an exercise program.  · Keep moving. Marilu Wilburnas the lawn, work in the garden, or [place]. Take the stairs instead of the elevator at work. · If you smoke, quit. People who smoke have an increased risk for heart attack, stroke, cancer, and other lung illnesses. Quitting is hard, but there are ways to boost your chance of quitting tobacco for good. ? Use nicotine gum, patches, or lozenges. ? Ask your doctor about stop-smoking programs and medicines. ? Keep trying. In addition to reducing your risk of diseases in the future, you will notice some benefits soon after you stop using tobacco. If you have shortness of breath or asthma symptoms, they will likely get better within a few weeks after you quit. · Limit how much alcohol you drink. Moderate amounts of alcohol (up to 2 drinks a day for men, 1 drink a day for women) are okay. But drinking too much can lead to liver problems, high blood pressure, and other health problems. Family health  If you have a family, there are many things you can do together to improve your health. · Eat meals together as a family as often as possible. · Eat healthy foods. This includes fruits, vegetables, lean meats and dairy, and whole grains. · Include your family in your fitness plan. Most people think of activities such as jogging or tennis as the way to fitness, but there are many ways you and your family can be more active.  Anything that makes you breathe hard and gets your heart pumping is exercise. Here are some tips:  ? Walk to do errands or to take your child to school or the bus.  ? Go for a family bike ride after dinner instead of watching TV. Where can you learn more? Go to http://blaze-marybel.info/. Enter A084 in the search box to learn more about \"A Healthy Lifestyle: Care Instructions. \"  Current as of: September 11, 2018  Content Version: 11.9  © 8836-2708 [employer]. Care instructions adapted under license by [employer] (which disclaims liability or warranty for this information). If you have questions about a medical condition or this instruction, always ask your healthcare professional. [address] any warranty or liability for your use of this information.

## 2019-07-16 NOTE — PROGRESS NOTES
Subjective:      Lorelei Benitez is a 63 y.o. male here for anxiety follow up and medication refill. He reports that he has been doing well with current therapy. He does have intermittent panic attacks, but is typically able to manage. Denies SI/HI. He returned to work in May and has been working lots of nights which has been causing him some distress. Does not interact with many people and is typically off by himself while at work. Interested in going out and meeting new people, but does not know of to best go about this. Current Outpatient Medications   Medication Sig Dispense Refill    LORazepam (ATIVAN) 2 mg tablet Take 1 Tab by mouth two (2) times a day. Max Daily Amount: 4 mg. 60 Tab 2    citalopram (CELEXA) 20 mg tablet Take 1 Tab by mouth daily. 30 Tab 5    buPROPion SR (WELLBUTRIN SR) 150 mg SR tablet Take 1 Tab by mouth two (2) times a day. 60 Tab 5    traZODone (DESYREL) 100 mg tablet Take 1 Tab by mouth nightly. 30 Tab 5    esomeprazole (NEXIUM) 20 mg capsule Take 20 mg by mouth daily. Allergies   Allergen Reactions    [place] Swelling    Peanut Swelling       Past Medical History:   Diagnosis Date    Anxiety with depression     Clinical depression     Dyspepsia     History of pneumonia 2016    Panic attacks        Social History     Tobacco Use    Smoking status: Never Smoker    Smokeless tobacco: Never Used   Substance Use Topics    Alcohol use: No        Review of Systems  Pertinent items are noted in HPI.      Objective:     Visit Vitals  /78 (BP 1 Location: Right arm, BP Patient Position: Sitting) Comment: Manual   Pulse 78   Temp 98.6 °F (37 °C) (Oral) Comment: .   Resp 18   Ht 5' 10\" (1.778 m)   Wt 165 lb (74.8 kg)   SpO2 98%   BMI 23.68 kg/m²      General appearance - alert, well appearing, and in no distress, anxious in appearance  Chest - clear to auscultation, no wheezes, rales or rhonchi, symmetric air entry, no tachypnea, retractions or cyanosis  Heart - normal rate, regular rhythm, normal S1, S2, no murmurs, rubs, clicks or gallops  Mental Status: alert, oriented to person, place, and time, normal mood, behavior, speech, dress, motor activity, and thought processes    Assessment/Plan:   Victoria Cheung is a 63 y.o. male seen for:     1. Anxiety with depression: stable, continue with current therapy. - LORazepam (ATIVAN) 2 mg tablet; Take 1 Tab by mouth two (2) times a day. Max Daily Amount: 4 mg. Dispense: 60 Tab; Refill: 2  - discussed outpatient therapy and information provided     2. Panic attacks  - LORazepam (ATIVAN) 2 mg tablet; Take 1 Tab by mouth two (2) times a day. Max Daily Amount: 4 mg. Dispense: 60 Tab; Refill: 2    I have discussed the diagnosis with the patient and the intended plan as seen in the above orders. The patient has received an after-visit summary and questions were answered concerning future plans. I have discussed medication side effects and warnings with the patient as well. Patient verbalizes understanding of plan of care and denies further questions or concerns at this time. Informed patient to return to the office if symptoms worsen or if new symptoms arise. Follow-up and Dispositions    · Return in about 3 months (around 10/16/2019) for anxiety follow up or sooner as needed.

## 2019-10-24 ENCOUNTER — OFFICE VISIT (OUTPATIENT)
Dept: FAMILY MEDICINE CLINIC | Age: 59
End: 2019-10-24

## 2019-10-24 VITALS
OXYGEN SATURATION: 97 % | RESPIRATION RATE: 18 BRPM | HEART RATE: 65 BPM | DIASTOLIC BLOOD PRESSURE: 70 MMHG | BODY MASS INDEX: 22.19 KG/M2 | HEIGHT: 70 IN | SYSTOLIC BLOOD PRESSURE: 102 MMHG | WEIGHT: 155 LBS | TEMPERATURE: 97.6 F

## 2019-10-24 DIAGNOSIS — F41.0 PANIC ATTACKS: ICD-10-CM

## 2019-10-24 DIAGNOSIS — Z12.11 SCREENING FOR COLON CANCER: ICD-10-CM

## 2019-10-24 DIAGNOSIS — F41.8 ANXIETY WITH DEPRESSION: Primary | ICD-10-CM

## 2019-10-24 DIAGNOSIS — Z23 ENCOUNTER FOR IMMUNIZATION: ICD-10-CM

## 2019-10-24 DIAGNOSIS — F99 INSOMNIA DUE TO OTHER MENTAL DISORDER: ICD-10-CM

## 2019-10-24 DIAGNOSIS — F51.05 INSOMNIA DUE TO OTHER MENTAL DISORDER: ICD-10-CM

## 2019-10-24 RX ORDER — TRAZODONE HYDROCHLORIDE 100 MG/1
100 TABLET ORAL
Qty: 90 TAB | Refills: 1 | Status: SHIPPED | OUTPATIENT
Start: 2019-10-24

## 2019-10-24 RX ORDER — BUPROPION HYDROCHLORIDE 150 MG/1
150 TABLET, EXTENDED RELEASE ORAL 2 TIMES DAILY
Qty: 180 TAB | Refills: 1 | Status: SHIPPED | OUTPATIENT
Start: 2019-10-24 | End: 2022-03-17 | Stop reason: ALTCHOICE

## 2019-10-24 RX ORDER — CITALOPRAM 20 MG/1
20 TABLET, FILM COATED ORAL DAILY
Qty: 90 TAB | Refills: 1 | Status: SHIPPED | OUTPATIENT
Start: 2019-10-24 | End: 2021-11-12

## 2019-10-24 RX ORDER — LORAZEPAM 2 MG/1
2 TABLET ORAL 2 TIMES DAILY
Qty: 60 TAB | Refills: 2 | Status: SHIPPED | OUTPATIENT
Start: 2019-10-24 | End: 2020-01-24 | Stop reason: SDUPTHER

## 2019-10-24 NOTE — PATIENT INSTRUCTIONS
Recombinant Zoster (Shingles) Vaccine, RZV: What you need to know  Why get vaccinated? Shingles (also called herpes zoster, or just zoster) is a painful skin rash, often with blisters. Shingles is caused by the varicella zoster virus, the same virus that causes chickenpox. After you have chickenpox, the virus stays in your body and can cause shingles later in life. You can't catch shingles from another person. However, a person who has never had chickenpox (or chickenpox vaccine) could get chickenpox from someone with shingles. A shingles rash usually appears on one side of the face or body and heals within 2 to 4 weeks. Its main symptom is pain, which can be severe. Other symptoms can include fever, headache, chills, and upset stomach. Very rarely, a shingles infection can lead to pneumonia, hearing problems, blindness, brain inflammation (encephalitis), or death. For about 1 person in 5, severe pain can continue even long after the rash has cleared up. This long-lasting pain is called post-herpetic neuralgia (PHN). Shingles is far more common in people 48years of age and older than in younger people, and the risk increases with age. It is also more common in people whose immune system is weakened because of a disease such as cancer, or by drugs such as steroids or chemotherapy. At least 1 million people a year in the [facility] get shingles. Shingles vaccine (recombinant)  Recombinant shingles vaccine was approved by FDA in 2017 for the prevention of shingles. In clinical trials, it was more than 90% effective in preventing shingles. It can also reduce the likelihood of PHN. Two doses, 2 to 6 months apart, are recommended for adults 48 and older. This vaccine is also recommended for people who have already gotten the live shingles vaccine (Zostavax). There is no live virus in this vaccine.   Some people should not get this vaccine  Tell your vaccine provider if you:  · Have any severe, life-threatening allergies. A person who has ever had a life-threatening allergic reaction after a dose of recombinant shingles vaccine, or has a severe allergy to any component of this vaccine, may be advised not to be vaccinated. Ask your health care provider if you want information about vaccine components. · Are pregnant or breastfeeding. There is not much information about use of recombinant shingles vaccine in pregnant or nursing women. Your healthcare provider might recommend delaying vaccination. · Are not feeling well. If you have a mild illness, such as a cold, you can probably get the vaccine today. If you are moderately or severely ill, you should probably wait until you recover. Your doctor can advise you. Risks of a vaccine reaction  With any medicine, including vaccines, there is a chance of reactions. After recombinant shingles vaccination, a person might experience:  · Pain, redness, soreness, or swelling at the site of the injection  · Headache, muscle aches, fever, shivering, fatigue  In clinical trials, most people got a sore arm with mild or moderate pain after vaccination, and some also had redness and swelling where they got the shot. Some people felt tired, had muscle pain, a headache, shivering, fever, stomach pain, or nausea. About 1 out of 6 people who got recombinant zoster vaccine experienced side effects that prevented them from doing regular activities. Symptoms went away on their own in about 2 to 3 days. Side effects were more common in younger people. You should still get the second dose of recombinant zoster vaccine even if you had one of these reactions after the first dose. Other things that could happen after this vaccine:  · People sometimes faint after medical procedures, including vaccination. Sitting or lying down for about 15 minutes can help prevent fainting and injuries caused by a fall.  Tell your provider if you feel dizzy or have vision changes or ringing in the ears.  · Some people get shoulder pain that can be more severe and longer-lasting than routine soreness that can follow injections. This happens very rarely. · Any medication can cause a severe allergic reaction. Such reactions to a vaccine are estimated at about 1 in a million doses, and would happen within a few minutes to a few hours after the vaccination. As with any medicine, there is a very remote chance of a vaccine causing a serious injury or death. The safety of vaccines is always being monitored. For more information, visit: www.cdc.gov/vaccinesafety/  What if there is a serious problem? What should I look for? · Look for anything that concerns you, such as signs of a severe allergic reaction, very high fever, or unusual behavior. Signs of a severe allergic reaction can include hives, swelling of the face and throat, difficulty breathing, a fast heartbeat, dizziness, and weakness. These would usually start a few minutes to a few hours after the vaccination. What should I do? · If you think it is a severe allergic reaction or other emergency that can't wait, call 9-1-1 or get to the nearest hospital. Otherwise, call your health care provider. · Afterward, the reaction should be reported to the Vaccine Adverse Event Reporting System (VAERS). Your doctor should file this report, or you can do it yourself through the VAERS website at www.vaers. hhs.gov, or by calling 5-998.966.1531. VAERS does not give medical advice. .  How can I learn more? · Ask your health care provider. He or she can give you the vaccine package insert or suggest other sources of information. · Call your local or state health department. · Contact the Centers for Disease Control and Prevention (CDC):  ? Call 2-876.110.6194 (1-800-CDC-INFO) or  ?  Visit CDC's website at www.cdc.gov/vaccines  Vaccine Information Statement (Interim)  Recombinant Zoster Vaccine  2/12/2018  [facility] Control and Prevention  Many Vaccine Information Statements are available in [place] and other languages. See www.immunize.org/vis. Hojas de Información Sobre Vacunas están disponibles en Español y en muchos otros idiomas. Visite Daren.si  Care instructions adapted under license by [employer] (which disclaims liability or warranty for this information). If you have questions about a medical condition or this instruction, always ask your healthcare professional. [place] [address] any warranty or liability for your use of this information. Influenza (Flu) Vaccine (Inactivated or Recombinant): What You Need to Know  Why get vaccinated? Influenza (\"flu\") is a contagious disease that spreads around the [facility] every winter, usually between October and May. Flu is caused by influenza viruses and is spread mainly by coughing, sneezing, and close contact. Anyone can get flu. Flu strikes suddenly and can last several days. Symptoms vary by age, but can include:  · Fever/chills. · Sore throat. · Muscle aches. · Fatigue. · Cough. · Headache. · Runny or stuffy nose. Flu can also lead to pneumonia and blood infections, and cause diarrhea and seizures in children. If you have a medical condition, such as heart or lung disease, flu can make it worse. Flu is more dangerous for some people. Infants and young children, people 72years of age and older, pregnant women, and people with certain health conditions or a weakened immune system are at greatest risk. Each year thousands of people in the [facility] die from flu, and many more are hospitalized. Flu vaccine can:  · Keep you from getting flu. · Make flu less severe if you do get it. · Keep you from spreading flu to your family and other people. Inactivated and recombinant flu vaccines  A dose of flu vaccine is recommended every flu season.  Children 6 months through 6years of age may need two doses during the same flu season. Everyone else needs only one dose each flu season. Some inactivated flu vaccines contain a very small amount of a mercury-based preservative called thimerosal. Studies have not shown thimerosal in vaccines to be harmful, but flu vaccines that do not contain thimerosal are available. There is no live flu virus in flu shots. They cannot cause the flu. There are many flu viruses, and they are always changing. Each year a new flu vaccine is made to protect against three or four viruses that are likely to cause disease in the upcoming flu season. But even when the vaccine doesn't exactly match these viruses, it may still provide some protection. Flu vaccine cannot prevent:  · Flu that is caused by a virus not covered by the vaccine. · Illnesses that look like flu but are not. Some people should not get this vaccine  Tell the person who is giving you the vaccine:  · If you have any severe (life-threatening) allergies. If you ever had a life-threatening allergic reaction after a dose of flu vaccine, or have a severe allergy to any part of this vaccine, you may be advised not to get vaccinated. Most, but not all, types of flu vaccine contain a small amount of egg protein. · If you ever had Guillain-Barré syndrome (also called GBS) Some people with a history of GBS should not get this vaccine. This should be discussed with your doctor. · If you are not feeling well. It is usually okay to get flu vaccine when you have a mild illness, but you might be asked to come back when you feel better. Risks of a vaccine reaction  With any medicine, including vaccines, there is a chance of reactions. These are usually mild and go away on their own, but serious reactions are also possible. Most people who get a flu shot do not have any problems with it.   Minor problems following a flu shot include:  · Soreness, redness, or swelling where the shot was given  · Hoarseness  · Sore, red or itchy eyes  · Cough  · Fever  · Aches  · Headache  · Itching  · Fatigue  If these problems occur, they usually begin soon after the shot and last 1 or 2 days. More serious problems following a flu shot can include the following:  · There may be a small increased risk of Guillain-Barré Syndrome (GBS) after inactivated flu vaccine. This risk has been estimated at 1 or 2 additional cases per million people vaccinated. This is much lower than the risk of severe complications from flu, which can be prevented by flu vaccine. · Ladarius Mattes children who get the flu shot along with pneumococcal vaccine (PCV13) and/or DTaP vaccine at the same time might be slightly more likely to have a seizure caused by fever. Ask your doctor for more information. Tell your doctor if a child who is getting flu vaccine has ever had a seizure  Problems that could happen after any injected vaccine:  · People sometimes faint after a medical procedure, including vaccination. Sitting or lying down for about 15 minutes can help prevent fainting, and injuries caused by a fall. Tell your doctor if you feel dizzy, or have vision changes or ringing in the ears. · Some people get severe pain in the shoulder and have difficulty moving the arm where a shot was given. This happens very rarely. · Any medication can cause a severe allergic reaction. Such reactions from a vaccine are very rare, estimated at about 1 in a million doses, and would happen within a few minutes to a few hours after the vaccination. As with any medicine, there is a very remote chance of a vaccine causing a serious injury or death. The safety of vaccines is always being monitored. For more information, visit: www.cdc.gov/vaccinesafety/. What if there is a serious reaction? What should I look for? · Look for anything that concerns you, such as signs of a severe allergic reaction, very high fever, or unusual behavior.   Signs of a severe allergic reaction can include hives, swelling of the face and throat, difficulty breathing, a fast heartbeat, dizziness, and weakness - usually within a few minutes to a few hours after the vaccination. What should I do? · If you think it is a severe allergic reaction or other emergency that can't wait, call 9-1-1 and get the person to the nearest hospital. Otherwise, call your doctor. · Reactions should be reported to the \"Vaccine Adverse Event Reporting System\" (VAERS). Your doctor should file this report, or you can do it yourself through the VAERS website at www.vaers. [facility].gov, or by calling 7-143.204.2466. VAERS does not give medical advice. The National Vaccine Injury Compensation Program  The National Vaccine Injury Compensation Program (VICP) is a federal program that was created to compensate people who may have been injured by certain vaccines. Persons who believe they may have been injured by a vaccine can learn about the program and about filing a claim by calling 6-126.913.2147 or visiting the [employer] website at www.[facility].gov/vaccinecompensation. There is a time limit to file a claim for compensation. How can I learn more? · Ask your healthcare provider. He or she can give you the vaccine package insert or suggest other sources of information. · Call your local or state health department. · Contact the Centers for Disease Control and Prevention (CDC):  ? Call 9-453.188.1850 (1-800-CDC-INFO) or  ? Visit CDC's website at www.cdc.gov/flu  Vaccine Information Statement  Inactivated Influenza Vaccine  8/7/2015)  [address] 661RU-78  Department of Health and Human Services  Centers for Disease Control and Prevention  Many Vaccine Information Statements are available in Bulgarian and other languages. See www.immunize.org/vis. Muchas hojas de información sobre vacunas están disponibles en español y en otros idiomas. Visite www.immunize.org/vis.   Care instructions adapted under license by [employer] (which disclaims liability or warranty for this information). If you have questions about a medical condition or this instruction, always ask your healthcare professional. [place] [address] any warranty or liability for your use of this information.

## 2019-10-24 NOTE — PROGRESS NOTES
Subjective:      Matt Magana is a 63 y.o. male here for anxiety follow up and medication refill. He reports that he has been doing well with current therapy. He does have intermittent panic attacks, but is typically able to manage. Denies SI/HI. He has been keeping busy at work. Health Maintenance:  · Colonoscopy: none, discussed screening options and he would like to screen with FIT testing  · Tetanus: 3/14/19  · Influenza vaccine: administer today   · Shingles vaccine: discussed and will provided Rx for       Current Outpatient Medications   Medication Sig Dispense Refill    LORazepam (ATIVAN) 2 mg tablet Take 1 Tab by mouth two (2) times a day. Max Daily Amount: 4 mg. 60 Tab 2    citalopram (CELEXA) 20 mg tablet Take 1 Tab by mouth daily. 30 Tab 5    buPROPion SR (WELLBUTRIN SR) 150 mg SR tablet Take 1 Tab by mouth two (2) times a day. 60 Tab 5    traZODone (DESYREL) 100 mg tablet Take 1 Tab by mouth nightly. 30 Tab 5    esomeprazole (NEXIUM) 20 mg capsule Take 20 mg by mouth daily. Allergies   Allergen Reactions    [place] Swelling    Peanut Swelling       Past Medical History:   Diagnosis Date    Anxiety with depression     Clinical depression     Dyspepsia     History of pneumonia 2016    Panic attacks        Social History     Tobacco Use    Smoking status: Never Smoker    Smokeless tobacco: Never Used   Substance Use Topics    Alcohol use: No        Review of Systems  Pertinent items are noted in HPI.      Objective:     Visit Vitals  /70 (BP 1 Location: Right arm, BP Patient Position: Sitting) Comment: Manual   Pulse 65   Temp 97.6 °F (36.4 °C) (Oral) Comment: .   Resp 18   Ht 5' 10\" (1.778 m)   Wt 155 lb (70.3 kg)   SpO2 97%   BMI 22.24 kg/m²      General appearance - alert, well appearing, and in no distress, anxious in appearance  Chest - clear to auscultation, no wheezes, rales or rhonchi, symmetric air entry, no tachypnea, retractions or cyanosis  Heart - normal rate, regular rhythm, normal S1, S2, no murmurs, rubs, clicks or gallops  Mental Status: alert, oriented to person, place, and time, normal mood, behavior, speech, dress, motor activity, and thought processes    Assessment/Plan:   Gus Nuno is a 63 y.o. male seen for:     1. Anxiety with depression: stable, continue with current medications. Follow up in 3 months. - citalopram (CELEXA) 20 mg tablet; Take 1 Tab by mouth daily. Dispense: 90 Tab; Refill: 1  - buPROPion SR (WELLBUTRIN SR) 150 mg SR tablet; Take 1 Tab by mouth two (2) times a day. Dispense: 180 Tab; Refill: 1  - LORazepam (ATIVAN) 2 mg tablet; Take 1 Tab by mouth two (2) times a day. Max Daily Amount: 4 mg. Dispense: 60 Tab; Refill: 2    2. Panic attacks  - LORazepam (ATIVAN) 2 mg tablet; Take 1 Tab by mouth two (2) times a day. Max Daily Amount: 4 mg. Dispense: 60 Tab; Refill: 2    3. Insomnia due to other mental disorder  - traZODone (DESYREL) 100 mg tablet; Take 1 Tab by mouth nightly. Dispense: 90 Tab; Refill: 1    4. Encounter for immunization: Influenza vaccine administered, VIS provided. - INFLUENZA VIRUS VAC QUAD,SPLIT,PRESV FREE SYRINGE IM  - CT IMMUNIZ ADMIN,1 SINGLE/COMB VAC/TOXOID  - varicella-zoster recombinant, PF, (SHINGRIX, PF,) 50 mcg/0.5 mL susr injection; 0.5mL by IntraMUSCular route once now and then repeat in 2-6 months  Dispense: 0.5 mL; Refill: 1    5. Screening for colon cancer  - OCCULT BLOOD IMMUNOASSAY,DIAGNOSTIC    I have discussed the diagnosis with the patient and the intended plan as seen in the above orders. The patient has received an after-visit summary and questions were answered concerning future plans. I have discussed medication side effects and warnings with the patient as well. Patient verbalizes understanding of plan of care and denies further questions or concerns at this time. Informed patient to return to the office if symptoms worsen or if new symptoms arise.        Follow-up and Dispositions    · Return in about 3 years (around 10/24/2022) for follow up or sooner as needed.

## 2019-10-24 NOTE — PROGRESS NOTES
Identified pt with two pt identifiers(name and ). Chief Complaint   Patient presents with    Follow-up    Medication Refill    Immunization/Injection     flu        Health Maintenance Due   Topic    Hepatitis C Screening     Shingrix Vaccine Age 50> (1 of 2)    FOBT Q 1 YEAR AGE 54-65     Influenza Age 5 to Adult        Wt Readings from Last 3 Encounters:   10/24/19 155 lb (70.3 kg)   19 165 lb (74.8 kg)   19 170 lb (77.1 kg)     Temp Readings from Last 3 Encounters:   10/24/19 97.6 °F (36.4 °C) (Oral)   19 98.6 °F (37 °C) (Oral)   19 99.5 °F (37.5 °C) (Oral)     BP Readings from Last 3 Encounters:   10/24/19 102/70   19 126/78   19 124/80     Pulse Readings from Last 3 Encounters:   10/24/19 65   19 78   19 96         Learning Assessment:  :     Learning Assessment 2019   PRIMARY LEARNER Patient   PRIMARY LANGUAGE ENGLISH   LEARNER PREFERENCE PRIMARY DEMONSTRATION   ANSWERED BY self   RELATIONSHIP SELF       Depression Screening:  :     3 most recent PHQ Screens 2019   PHQ Not Done Active Diagnosis of Depression or Bipolar Disorder       Fall Risk Assessment:  :     No flowsheet data found. Abuse Screening:  :     Abuse Screening Questionnaire 2019   Do you ever feel afraid of your partner? N   Are you in a relationship with someone who physically or mentally threatens you? N   Is it safe for you to go home? Y       Coordination of Care Questionnaire:  :     1) Have you been to an emergency room, urgent care clinic since your last visit? no   Hospitalized since your last visit? no             2) Have you seen or consulted any other health care providers outside of [address] [place] [address] since your last visit? no  (Include any pap smears or colon screenings in this section.)    3) Do you have an Advance Directive on file? no  Are you interested in receiving information about Advance Directives?  no    Reviewed record in preparation for visit and have obtained necessary documentation. Medication reconciliation up to date and corrected with patient at this time.

## 2020-01-24 ENCOUNTER — OFFICE VISIT (OUTPATIENT)
Dept: FAMILY MEDICINE CLINIC | Age: 60
End: 2020-01-24

## 2020-01-24 DIAGNOSIS — F41.8 ANXIETY WITH DEPRESSION: Primary | ICD-10-CM

## 2020-01-24 DIAGNOSIS — F41.0 PANIC ATTACKS: ICD-10-CM

## 2020-01-24 RX ORDER — LORAZEPAM 2 MG/1
2 TABLET ORAL 2 TIMES DAILY
Qty: 60 TAB | Refills: 2 | Status: SHIPPED | OUTPATIENT
Start: 2020-01-24

## 2020-01-24 NOTE — PROGRESS NOTES
Identified pt with two pt identifiers(name and ). Chief Complaint   Patient presents with    Medication Refill        Health Maintenance Due   Topic    Hepatitis C Screening     FOBT Q 1 YEAR AGE 50-75     Shingrix Vaccine Age 50> (2 of 2)       Wt Readings from Last 3 Encounters:   20 163 lb (73.9 kg)   20 163 lb (73.9 kg)   10/24/19 155 lb (70.3 kg)     Temp Readings from Last 3 Encounters:   20 98.5 °F (36.9 °C) (Oral)   20 98.5 °F (36.9 °C) (Oral)   10/24/19 97.6 °F (36.4 °C) (Oral)     BP Readings from Last 3 Encounters:   20 112/62   20 112/62   10/24/19 102/70     Pulse Readings from Last 3 Encounters:   20 65   20 65   10/24/19 65         Learning Assessment:  :     Learning Assessment 2019   PRIMARY LEARNER Patient   PRIMARY LANGUAGE ENGLISH   LEARNER PREFERENCE PRIMARY DEMONSTRATION   ANSWERED BY self   RELATIONSHIP SELF       Depression Screening:  :     3 most recent PHQ Screens 2019   PHQ Not Done Active Diagnosis of Depression or Bipolar Disorder       Fall Risk Assessment:  :     No flowsheet data found. Abuse Screening:  :     Abuse Screening Questionnaire 2019   Do you ever feel afraid of your partner? N   Are you in a relationship with someone who physically or mentally threatens you? N   Is it safe for you to go home? Y       Coordination of Care Questionnaire:  :     1) Have you been to an emergency room, urgent care clinic since your last visit? no   Hospitalized since your last visit? no             2) Have you seen or consulted any other health care providers outside of [address] [place] [address] since your last visit? no  (Include any pap smears or colon screenings in this section.)    3) Do you have an Advance Directive on file? no  Are you interested in receiving information about Advance Directives? no    Reviewed record in preparation for visit and have obtained necessary documentation.   Medication reconciliation up to date and corrected with patient at this time.

## 2020-01-24 NOTE — PROGRESS NOTES
Subjective:      Ventura Holman is a 63 y.o. male here for anxiety follow up and medication refill. He reports that he has been doing well with current therapy. He does have intermittent panic attacks, but is typically able to manage. Denies SI/HI. Current Outpatient Medications   Medication Sig Dispense Refill    citalopram (CELEXA) 20 mg tablet Take 1 Tab by mouth daily. 90 Tab 1    buPROPion SR (WELLBUTRIN SR) 150 mg SR tablet Take 1 Tab by mouth two (2) times a day. 180 Tab 1    traZODone (DESYREL) 100 mg tablet Take 1 Tab by mouth nightly. 90 Tab 1    LORazepam (ATIVAN) 2 mg tablet Take 1 Tab by mouth two (2) times a day. Max Daily Amount: 4 mg. 60 Tab 2    esomeprazole (NEXIUM) 20 mg capsule Take 20 mg by mouth daily. Allergies   Allergen Reactions    [place] Swelling    Peanut Swelling       Past Medical History:   Diagnosis Date    Anxiety with depression     Clinical depression     Dyspepsia     History of pneumonia 2016    Panic attacks        Social History     Tobacco Use    Smoking status: Never Smoker    Smokeless tobacco: Never Used   Substance Use Topics    Alcohol use: No        Review of Systems  Pertinent items are noted in HPI. Objective:     Visit Vitals  /62 (BP 1 Location: Right arm, BP Patient Position: Sitting)   Pulse 65   Temp 98.5 °F (36.9 °C) (Oral) Comment: .   Resp 18   Ht 5' 10\" (1.778 m)   Wt 163 lb (73.9 kg)   BMI 23.39 kg/m²      General appearance - alert, well appearing, and in no distress, anxious in appearance  Chest - clear to auscultation, no wheezes, rales or rhonchi, symmetric air entry, no tachypnea, retractions or cyanosis  Heart - normal rate, regular rhythm, normal S1, S2, no murmurs, rubs, clicks or gallops  Mental Status: alert, oriented to person, place, and time, normal mood, behavior, speech, dress, motor activity, and thought processes    Assessment/Plan:   Ventura Holman is a 63 y.o. male seen for:     1.  Anxiety with depression: stable, continue with current therapy. Prescription monitoring appropriate.   - LORazepam (ATIVAN) 2 mg tablet; Take 1 Tab by mouth two (2) times a day. Max Daily Amount: 4 mg. Dispense: 60 Tab; Refill: 2    2. Panic attacks  - LORazepam (ATIVAN) 2 mg tablet; Take 1 Tab by mouth two (2) times a day. Max Daily Amount: 4 mg. Dispense: 60 Tab; Refill: 2    I have discussed the diagnosis with the patient and the intended plan as seen in the above orders. The patient has received an after-visit summary and questions were answered concerning future plans. I have discussed medication side effects and warnings with the patient as well. Patient verbalizes understanding of plan of care and denies further questions or concerns at this time. Informed patient to return to the office if symptoms worsen or if new symptoms arise. Follow-up and Dispositions    · Return in about 3 months (around 4/24/2020) for follow up or sooner as needed.

## 2020-01-29 VITALS
BODY MASS INDEX: 23.34 KG/M2 | HEART RATE: 65 BPM | TEMPERATURE: 98.5 F | RESPIRATION RATE: 18 BRPM | SYSTOLIC BLOOD PRESSURE: 112 MMHG | DIASTOLIC BLOOD PRESSURE: 62 MMHG | WEIGHT: 163 LBS | OXYGEN SATURATION: 97 % | HEIGHT: 70 IN

## 2020-02-06 LAB — HEMOCCULT STL QL IA: NEGATIVE

## 2020-09-21 DIAGNOSIS — F41.8 ANXIETY WITH DEPRESSION: ICD-10-CM

## 2020-09-23 RX ORDER — BUPROPION HYDROCHLORIDE 150 MG/1
TABLET, EXTENDED RELEASE ORAL
Qty: 180 TAB | Refills: 0 | OUTPATIENT
Start: 2020-09-23

## 2020-09-26 DIAGNOSIS — F41.8 ANXIETY WITH DEPRESSION: ICD-10-CM

## 2020-09-28 DIAGNOSIS — F41.8 ANXIETY WITH DEPRESSION: ICD-10-CM

## 2020-09-28 RX ORDER — BUPROPION HYDROCHLORIDE 150 MG/1
TABLET, EXTENDED RELEASE ORAL
Qty: 180 TAB | Refills: 0 | OUTPATIENT
Start: 2020-09-28

## 2020-09-29 RX ORDER — BUPROPION HYDROCHLORIDE 150 MG/1
TABLET, EXTENDED RELEASE ORAL
Qty: 180 TAB | Refills: 0 | OUTPATIENT
Start: 2020-09-29

## 2020-10-02 DIAGNOSIS — F41.8 ANXIETY WITH DEPRESSION: ICD-10-CM

## 2020-10-07 RX ORDER — BUPROPION HYDROCHLORIDE 150 MG/1
TABLET, EXTENDED RELEASE ORAL
Qty: 180 TAB | Refills: 0 | OUTPATIENT
Start: 2020-10-07

## 2021-11-12 ENCOUNTER — OFFICE VISIT (OUTPATIENT)
Dept: NEUROLOGY | Age: 61
End: 2021-11-12
Payer: MEDICAID

## 2021-11-12 VITALS
BODY MASS INDEX: 23.34 KG/M2 | HEART RATE: 82 BPM | WEIGHT: 163 LBS | DIASTOLIC BLOOD PRESSURE: 74 MMHG | RESPIRATION RATE: 16 BRPM | OXYGEN SATURATION: 96 % | TEMPERATURE: 97.1 F | SYSTOLIC BLOOD PRESSURE: 120 MMHG | HEIGHT: 70 IN

## 2021-11-12 DIAGNOSIS — G25.0 ESSENTIAL TREMOR: ICD-10-CM

## 2021-11-12 DIAGNOSIS — R29.2 HOFFMAN SIGN PRESENT: ICD-10-CM

## 2021-11-12 DIAGNOSIS — R29.2 ABNORMAL DTR (DEEP TENDON REFLEX): ICD-10-CM

## 2021-11-12 DIAGNOSIS — R25.9 ABNORMAL INVOLUNTARY MOVEMENTS: Primary | ICD-10-CM

## 2021-11-12 PROCEDURE — 99204 OFFICE O/P NEW MOD 45 MIN: CPT | Performed by: PSYCHIATRY & NEUROLOGY

## 2021-11-12 RX ORDER — ARIPIPRAZOLE 5 MG/1
TABLET ORAL
COMMUNITY
End: 2022-03-17 | Stop reason: ALTCHOICE

## 2021-11-12 RX ORDER — VALACYCLOVIR HYDROCHLORIDE 1 G/1
TABLET, FILM COATED ORAL
COMMUNITY

## 2021-11-12 NOTE — PATIENT INSTRUCTIONS
RESULT POLICY      If we have ordered testing for you, know that; \"NO NEWS IS GOOD NEWS! \"     It is our policy that we no longer call patients with results, nor do we  give test results over the phone. We schedule follow up appointments so that your results can be discussed in person. This allows you to address any questions you have regarding the results. If you choose to go to an imaging center outside of [facility], it is your responsibility to bring imaging report and disc to follow up appointment. If something of concern is revealed on your test, we will contact you to discuss the matter and if needed schedule a sooner follow up appointment. Additionally, results may be found by using the My Chart feature and one of our patient service representatives at the  can give you instructions on how to access this feature to utilize our electronic medical record system. Thank you for your understanding. [facility]   Statement to Patients  April 1, 2014      In an effort to ensure the large volume of patient prescription refills is processed in the most efficient and expeditious manner, we are asking our patients to assist us by calling your Pharmacy for all prescription refills, this will include also your  Mail Order Pharmacy. The pharmacy will contact our office electronically to continue the refill process. Please do not wait until the last minute to call your pharmacy. We need at least 48 hours (2days) to fill prescriptions. We also encourage you to call your pharmacy before going to  your prescription to make sure it is ready. With regard to controlled substance prescription refill requests (narcotic refills) that need to be picked up at our office, we ask your cooperation by providing us with at least 72 hours (3days) notice that you will need a refill.     We will not refill narcotic prescription refill requests after 4:00pm on any weekday, Monday through Thursday, or after 2:00pm on Fridays, or on the weekends. We encourage everyone to explore another way of getting your prescription refill request processed using Spock, our patient web portal through our electronic medical record system. Spock is an efficient and effective way to communicate your medication request directly to the office and  downloadable as an rajiv on your smart phone . Spock also features a review functionality that allows you to view your medication list as well as leave messages for your physician. Are you ready to get connected? If so please review the attatched instructions or speak to any of our staff to get you set up right away! Thank you so much for your cooperation. Should you have any questions please contact our Practice Administrator.

## 2021-11-12 NOTE — PROGRESS NOTES
[facility] and [address] at [facility] Neurology Clinics at [address] [facility] [address] [place], [address] [facility] [address] [facility], [address] [place] [address]  [phone] Office  [phone] Facsimile           Referring: Flores Holman MD  [address]    Chief Complaint   Patient presents with    New Patient    Tremors     mild tremor in hands x 4 yrs, more severe tremor on left side of body started approx one yr ago with any kind of exertion     81-year-old gentleman who presents today for neurologic consultation regarding abnormal movements. He says about 3 to 4 years ago he started to have a slight hand tremor. His mother and brother also have hand tremor. This is a tremor he notices when he is doing activities such as holding an object or handing something to someone or using a screwdriver. The more pressing issue has become probably about a year or so ago he started to have these very high amplitude uncontrolled movements of his left upper and lower extremity brought on by coughing sneezing, ejaculating etc.  He notes that it is only the left upper and lower extremity. It will shake for 5-7 seconds again quite briskly. He has thrown objects when it happens. He cannot control it. No inciting factor. No injury. No illness. No electric shock type sensation down his spine. No focal complaints. No changes in medicine. It never comes on unless it is provoked. Record review finds an office visit with Dr. Mert Arenas dated January 24, 2020 where he was coming in for following up regarding anxiety and panic attacks. Diagnoses were anxiety with depression as well as panic attack. He was given Ativan.       Past Medical History:   Diagnosis Date    Anxiety with depression     Clinical depression     Dyspepsia     History of pneumonia 2016    Panic attacks        Past Surgical History: Procedure Laterality Date    HX HEENT      right eye, small growth removal       Current Outpatient Medications   Medication Sig Dispense Refill    ARIPiprazole (ABILIFY) 5 mg tablet aripiprazole 5 mg tablet   Take 1 tablet every day by oral route.  valACYclovir (VALTREX) 1 gram tablet valacyclovir 1 gram tablet   TAKE 1 TABLET BY MOUTH THREE TIMES DAILY      LORazepam (ATIVAN) 2 mg tablet Take 1 Tab by mouth two (2) times a day. Max Daily Amount: 4 mg. 60 Tab 2    buPROPion SR (WELLBUTRIN SR) 150 mg SR tablet Take 1 Tab by mouth two (2) times a day. 180 Tab 1    traZODone (DESYREL) 100 mg tablet Take 1 Tab by mouth nightly. 90 Tab 1    esomeprazole (NEXIUM) 20 mg capsule Take 20 mg by mouth daily. Allergies   Allergen Reactions    [place] Swelling    Peanut Swelling       Social History     Tobacco Use    Smoking status: Never Smoker    Smokeless tobacco: Never Used   Vaping Use    Vaping Use: Every day    Substances: THC    Devices: Pre-filled pod   Substance Use Topics    Alcohol use: No    Drug use: Yes     Types: Marijuana       Family History   Problem Relation Age of Onset    Diabetes Mother     Hypertension Mother     Cancer Father         unknown     Review of Systems  Pertinent positives and negatives as noted. Examination  Visit Vitals  /74 (BP 1 Location: Left upper arm, BP Patient Position: Sitting, BP Cuff Size: Adult)   Pulse 82   Temp 97.1 °F (36.2 °C)   Resp 16   Ht 5' 10\" (1.778 m)   Wt 73.9 kg (163 lb)   SpO2 96%   BMI 23.39 kg/m²       Neurologically, he is awake, alert, oriented with normal speech, language, and cognition. Cranial nerves intact 2-12. He has normal bulk and tone and specifically no cogwheeling. There is fine postural tremor of the outstretched hands with intention on finger-nose-finger. No rest tremor. There is no pronation or drift.  He is able to generate full strength in the upper and lower extremities and all muscle groups to direct confrontational testing. Reflexes are pathologically brisk in the upper and lower extremities bilaterally. He has bilateral Christy. He has no ataxia or past pointing. No Lhermitte sign. Impression/Plan  80-year-old gentleman with abnormal involuntary movements as stated with pathologically brisk reflexes and Christy and this would point to cervical spine is anatomic localization and question whether these are the historically called \"spinal seizure\" seen with cord lesion  We will get MRI of the cervical spine  EEG to evaluate for epileptiform  If MRI of the cervical spine is unrevealing we will get MRI of the brain    Essential tremor  We discussed this is benign and different from what he is describing  If that becomes bothersome we certainly can address that with medication in the future  Follow-up after testing    Aury Yang MD          This note was created using voice recognition software. Despite editing, there may be syntax errors.

## 2021-11-12 NOTE — PROGRESS NOTES
Chief Complaint   Patient presents with    New Patient    Tremors     mild tremor in hands x 4 yrs, more severe tremor on left side of body started approx one yr ago with any kind of exertion

## 2021-11-24 ENCOUNTER — HOSPITAL ENCOUNTER (OUTPATIENT)
Dept: MRI IMAGING | Age: 61
Discharge: HOME | End: 2021-11-24
Attending: PSYCHIATRY & NEUROLOGY
Payer: MEDICAID

## 2021-11-24 DIAGNOSIS — R29.2 ABNORMAL DTR (DEEP TENDON REFLEX): ICD-10-CM

## 2021-11-24 DIAGNOSIS — R25.9 ABNORMAL INVOLUNTARY MOVEMENTS: ICD-10-CM

## 2021-11-24 DIAGNOSIS — R29.2 HOFFMAN SIGN PRESENT: ICD-10-CM

## 2021-11-24 PROCEDURE — 72141 MRI NECK SPINE W/O DYE: CPT

## 2021-11-29 ENCOUNTER — TELEPHONE (OUTPATIENT)
Dept: NEUROLOGY | Age: 61
End: 2021-11-29

## 2021-11-29 DIAGNOSIS — G95.89 MYELOMALACIA (HCC): ICD-10-CM

## 2021-11-29 DIAGNOSIS — G95.9 MYELOPATHY (HCC): Primary | ICD-10-CM

## 2021-11-29 DIAGNOSIS — M48.02 CERVICAL STENOSIS OF SPINE: ICD-10-CM

## 2021-11-29 NOTE — TELEPHONE ENCOUNTER
referral placed with [facility] neurosurgery as Dr. Johnnie Casillas does not accept medicaid. LVM with pt letting him know to call [phone] opt 4 to schedule with [facility].   Faxed note and MRI to [phone]

## 2021-11-29 NOTE — TELEPHONE ENCOUNTER
----- Message from Samaria Keita MD sent at 11/26/2021 11:43 AM EST -----  Send pt to Dr. Jazmin Harrison cervical stenosis with myelopathy  ----- Message -----  From: Joel Campbell Results In  Sent: 11/24/2021  12:08 PM EST  To: Samaria Keita MD

## 2021-12-01 ENCOUNTER — OFFICE VISIT (OUTPATIENT)
Dept: NEUROLOGY | Age: 61
End: 2021-12-01

## 2021-12-01 DIAGNOSIS — R25.9 ABNORMAL INVOLUNTARY MOVEMENTS: Primary | ICD-10-CM

## 2021-12-09 ENCOUNTER — OFFICE VISIT (OUTPATIENT)
Dept: NEUROLOGY | Age: 61
End: 2021-12-09
Payer: MEDICAID

## 2021-12-09 VITALS
SYSTOLIC BLOOD PRESSURE: 112 MMHG | WEIGHT: 163 LBS | TEMPERATURE: 97.6 F | OXYGEN SATURATION: 98 % | RESPIRATION RATE: 16 BRPM | BODY MASS INDEX: 23.39 KG/M2 | HEART RATE: 78 BPM | DIASTOLIC BLOOD PRESSURE: 76 MMHG

## 2021-12-09 DIAGNOSIS — M48.02 STENOSIS OF CERVICAL SPINE WITH MYELOPATHY (HCC): Primary | ICD-10-CM

## 2021-12-09 DIAGNOSIS — G99.2 STENOSIS OF CERVICAL SPINE WITH MYELOPATHY (HCC): Primary | ICD-10-CM

## 2021-12-09 PROCEDURE — 99214 OFFICE O/P EST MOD 30 MIN: CPT | Performed by: PSYCHIATRY & NEUROLOGY

## 2021-12-09 NOTE — PROGRESS NOTES
[facility] Neurology Clinics and [address] at [facility] Neurology Clinics at [address] [facility], [address] [facility] [address] [facility], [address] [place] [address]   [phone]              Chief Complaint   Patient presents with    Tremors     no change    Results     eeg, mri, dop     Current Outpatient Medications   Medication Sig Dispense Refill    ARIPiprazole (ABILIFY) 5 mg tablet aripiprazole 5 mg tablet   Take 1 tablet every day by oral route.  valACYclovir (VALTREX) 1 gram tablet valacyclovir 1 gram tablet   TAKE 1 TABLET BY MOUTH THREE TIMES DAILY      LORazepam (ATIVAN) 2 mg tablet Take 1 Tab by mouth two (2) times a day. Max Daily Amount: 4 mg. 60 Tab 2    buPROPion SR (WELLBUTRIN SR) 150 mg SR tablet Take 1 Tab by mouth two (2) times a day. 180 Tab 1    traZODone (DESYREL) 100 mg tablet Take 1 Tab by mouth nightly. 90 Tab 1    esomeprazole (NEXIUM) 20 mg capsule Take 20 mg by mouth daily. Allergies   Allergen Reactions    [place] Swelling    Peanut Swelling     Social History     Tobacco Use    Smoking status: Never Smoker    Smokeless tobacco: Never Used   Vaping Use    Vaping Use: Every day    Substances: THC    Devices: Pre-filled pod   Substance Use Topics    Alcohol use: No    Drug use: Yes     Types: Marijuana     81-year-old gentleman returns today for follow-up. His last visit with me was November 12 for an initial neurologic consultation for abnormal involuntary movements and he had pathologically brisk reflexes and a Christy mimic question cord lesion. I sent him for MRI of the cervical spine as well as an EEG. He did have an essential tremor. MRI of the cervical spine demonstrated degenerative change with significant spinal stenosis at C3-C4 with cord signal change C3-C4 and I did personally review that film. I did asked for him to be referred to neurosurgery.   EEG was normal  He continues to have the abnormal movements. He has not received a call from [facility] yet about his neurosurgical appointment    Examination  Visit Vitals  /76 (BP 1 Location: Left upper arm, BP Patient Position: Sitting, BP Cuff Size: Adult)   Pulse 78   Temp 97.6 °F (36.4 °C)   Resp 16   Wt 73.9 kg (163 lb)   SpO2 98%   BMI 23.39 kg/m²   He is a pleasant gentleman. He is awake alert oriented and conversant. He has abnormally brisk reflexes. He has bilateral Christy's. Impression/Plan  Cervical stenosis with cervical myelopathy  I printed him a picture of his MRI and reviewed it with him  I have asked my nurse to investigate and reengage with [facility] regarding his neurosurgical appointment  I encouraged him to follow-up and see neurosurgery and I am certain he is going to do so and we did discuss risks of fall or other injury such as MVA etc. which could lead to paralysis  Follow-up here as needed    Mila Street MD        This note was created using voice recognition software. Despite editing, there may be syntax errors.

## 2021-12-12 NOTE — PROCEDURES
EEG:      Date:  12/01/21    Requesting Physician:  Teresa Tai MD     An EEG is requested in this 73-year-old man to evaluate for epileptiform abnormality. Medications:  Medications are listed Aripiprazole, Wellbutrin, Trazodone, Nexium. This tracing is obtained during the awake state. During wakefulness the background consists of diffuse low voltage, fast frequency, beta wave activity. Hyperventilation is not performed. Intermittent photic stimulation induces symmetric posterior driving responses. Sleep is not attained. Interpretation:   This EEG recorded during the awake state is normal.

## 2021-12-20 ENCOUNTER — TELEPHONE (OUTPATIENT)
Dept: NEUROLOGY | Age: 61
End: 2021-12-20

## 2021-12-20 NOTE — TELEPHONE ENCOUNTER
P/T is requesting all the information to faxed over to Dr. Jocelyn Rebolledo before his surgery that he needs. Fax #[phone].

## 2022-03-17 ENCOUNTER — OFFICE VISIT (OUTPATIENT)
Dept: NEUROLOGY | Age: 62
End: 2022-03-17
Payer: MEDICAID

## 2022-03-17 VITALS
DIASTOLIC BLOOD PRESSURE: 62 MMHG | OXYGEN SATURATION: 98 % | TEMPERATURE: 97.8 F | SYSTOLIC BLOOD PRESSURE: 100 MMHG | HEART RATE: 68 BPM | RESPIRATION RATE: 14 BRPM | WEIGHT: 163 LBS | BODY MASS INDEX: 23.39 KG/M2

## 2022-03-17 DIAGNOSIS — M48.02 STENOSIS OF CERVICAL SPINE WITH MYELOPATHY (HCC): Primary | ICD-10-CM

## 2022-03-17 DIAGNOSIS — G99.2 STENOSIS OF CERVICAL SPINE WITH MYELOPATHY (HCC): Primary | ICD-10-CM

## 2022-03-17 PROCEDURE — 99213 OFFICE O/P EST LOW 20 MIN: CPT | Performed by: PSYCHIATRY & NEUROLOGY

## 2022-03-17 RX ORDER — DESVENLAFAXINE SUCCINATE 50 MG/1
TABLET, EXTENDED RELEASE ORAL
COMMUNITY

## 2022-03-17 NOTE — PROGRESS NOTES
[facility]rology [facility] and [address] at [facility]rology [facility]ics at [address], [address] [facility] [address] [facility], [address] [place] [address]   [phone]              No chief complaint on file. Current Outpatient Medications   Medication Sig Dispense Refill    desvenlafaxine succinate (PRISTIQ) 50 mg ER tablet desvenlafaxine succinate ER 50 mg tablet,extended release 24 hr      valACYclovir (VALTREX) 1 gram tablet valacyclovir 1 gram tablet   TAKE 1 TABLET BY MOUTH THREE TIMES DAILY      LORazepam (ATIVAN) 2 mg tablet Take 1 Tab by mouth two (2) times a day. Max Daily Amount: 4 mg. 60 Tab 2    traZODone (DESYREL) 100 mg tablet Take 1 Tab by mouth nightly. 90 Tab 1    esomeprazole (NEXIUM) 20 mg capsule Take 20 mg by mouth daily. Allergies   Allergen Reactions    Beauty Swelling    Peanut Swelling     Social History     Tobacco Use    Smoking status: Never Smoker    Smokeless tobacco: Never Used   Vaping Use    Vaping Use: Every day    Substances: THC    Devices: Pre-filled pod   Substance Use Topics    Alcohol use: No    Drug use: Yes     Types: Marijuana     81-year-old man returns today for follow-up. I saw him last December 9, 2021. He had cervical stenosis with cervical myelopathy. He was to see neurosurgery down at [facility]. Office visit with Dr. Pratibha Shah dated to be Feb 21st 2022 where he was following up 6 weeks after ACDF. He was doing well. No neck pain. No arm pain. Shakiness is diminished. He was starting to normalize activity. Examination found some redness over his incision site but he been shaving over it. Strength was full. He had Christy in the left upper extremity and crossed adductors. He was said to be doing well and did not need clinic follow-up unless new problems.     X-ray of the cervical spine with C3-C5 hardware in good position and natural lordosis have been restored. Today he tells me he is doing very well. He is almost gotten his strength back to normal.  He is looking forward to starting work again in the spring at [employer]. He does feel like his strength is a little decreased in the morning. He has had no further movements except for couple of times right after surgery but none thereafter. Examination  Visit Vitals  /62 (BP 1 Location: Left upper arm, BP Patient Position: Sitting, BP Cuff Size: Adult)   Pulse 68   Temp 97.8 °F (36.6 °C)   Resp 14   Wt 73.9 kg (163 lb)   SpO2 98%   BMI 23.39 kg/m²     He is a very pleasant gentleman. He is awake alert and oriented. He has normal speech and normal language. Normal cognition. No pronation or drift. Resists fully in all muscle groups in the upper extremities. Bilateral Christy. Steady gait. 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 Despite editing, there may be syntax errors.

## 2022-12-24 ENCOUNTER — HOSPITAL ENCOUNTER (EMERGENCY)
Age: 62
Discharge: HOME | End: 2022-12-25
Attending: EMERGENCY MEDICINE
Payer: MEDICAID

## 2022-12-24 VITALS
RESPIRATION RATE: 18 BRPM | BODY MASS INDEX: 23.4 KG/M2 | DIASTOLIC BLOOD PRESSURE: 77 MMHG | HEART RATE: 71 BPM | WEIGHT: 158 LBS | SYSTOLIC BLOOD PRESSURE: 129 MMHG | HEIGHT: 69 IN | TEMPERATURE: 97.6 F | OXYGEN SATURATION: 98 %

## 2022-12-24 DIAGNOSIS — T14.8XXA ABRASION: ICD-10-CM

## 2022-12-24 DIAGNOSIS — S61.259A ANIMAL BITE OF FINGER, INITIAL ENCOUNTER: Primary | ICD-10-CM

## 2022-12-24 PROCEDURE — 90471 IMMUNIZATION ADMIN: CPT

## 2022-12-24 PROCEDURE — 99284 EMERGENCY DEPT VISIT MOD MDM: CPT

## 2022-12-24 NOTE — Clinical Note
[address] EMERGENCY DEPT  [address] [facility]  [place] [phone]  [phone]    Work/School Note    Date: 12/24/2022    To Whom It May concern:    Osorio Earl was seen and treated today in the emergency room by the following provider(s):  Attending Provider: Frandy Trevino MD.      Osorio Earl is excused from work/school on 12/25/22 and 12/26/22. He is medically clear to return to work/school on 12/27/2022.        Sincerely,          Asia Sevilla MD

## 2022-12-25 PROCEDURE — 90714 TD VACC NO PRESV 7 YRS+ IM: CPT | Performed by: EMERGENCY MEDICINE

## 2022-12-25 PROCEDURE — 74011250636 HC RX REV CODE- 250/636: Performed by: EMERGENCY MEDICINE

## 2022-12-25 RX ORDER — NAPROXEN 500 MG/1
500 TABLET ORAL 2 TIMES DAILY WITH MEALS
Qty: 20 TABLET | Refills: 0 | Status: SHIPPED | OUTPATIENT
Start: 2022-12-25

## 2022-12-25 RX ORDER — AMOXICILLIN AND CLAVULANATE POTASSIUM 875; 125 MG/1; MG/1
1 TABLET, FILM COATED ORAL 2 TIMES DAILY
Qty: 20 TABLET | Refills: 0 | Status: SHIPPED | OUTPATIENT
Start: 2022-12-24 | End: 2023-01-03

## 2022-12-25 RX ADMIN — TETANUS AND DIPHTHERIA TOXOIDS ADSORBED 0.5 ML: 2; 2 INJECTION INTRAMUSCULAR at 00:05

## 2022-12-25 NOTE — ED PROVIDER NOTES
44-year-old male with a past medical history significant for anxiety and depression, dyspepsia, panic attack who presents to the ER and state that his cat brought into the house a squirrel that has been screaming all over his bedroom and went to head into the bed. He put some glove into the left hand and attempted to retrieve the animal and was bitten through the glove into his left middle finger but there was no break in the skin and was brought to the ER for further evaluation. He denies any other discomfort at this time. He does not remember the last time he had a tetanus immunization.        Past Medical History:   Diagnosis Date    Anxiety with depression     Clinical depression     Dyspepsia     History of pneumonia 2016    Panic attacks        Past Surgical History:   Procedure Laterality Date    HX HEENT      right eye, small growth removal         Family History:   Problem Relation Age of Onset    Diabetes Mother     Hypertension Mother     Cancer Father         unknown       Social History     Socioeconomic History    Marital status: SINGLE     Spouse name: Not on file    Number of children: Not on file    Years of education: Not on file    Highest education level: Not on file   Occupational History    Not on file   Tobacco Use    Smoking status: Never    Smokeless tobacco: Never   Vaping Use    Vaping Use: Every day    Substances: THC    Devices: Pre-filled pod   Substance and Sexual Activity    Alcohol use: No    Drug use: Yes     Types: Marijuana    Sexual activity: Not on file   Other Topics Concern    Not on file   Social History Narrative    Not on file     Social Determinants of Health     Financial Resource Strain: Not on file   Food Insecurity: Not on file   Transportation Needs: Not on file   Physical Activity: Not on file   Stress: Not on file   Social Connections: Not on file   Intimate Partner Violence: Not on file   Housing Stability: Not on file         ALLERGIES: [place] and Nut [peanut]    Review of Systems   All other systems reviewed and are negative. Vitals:    12/24/22 2335   BP: 129/77   Pulse: 71   Resp: 18   Temp: 97.6 °F (36.4 °C)   SpO2: 98%   Weight: 71.7 kg (158 lb)   Height: 5' 9\" (1.753 m)            Physical Exam  Vitals and nursing note reviewed. Exam conducted with a chaperone present. CONSTITUTIONAL: Well-appearing; well-nourished; in no apparent distress  HEAD: Normocephalic; atraumatic  EYES: PERRL; EOM intact; conjunctiva and sclera are clear bilaterally. ENT: No rhinorrhea; normal pharynx with no tonsillar hypertrophy; mucous membranes pink/moist, no erythema, no exudate. NECK: Supple; non-tender; no cervical lymphadenopathy  CARD: Normal S1, S2; no murmurs, rubs, or gallops. Regular rate and rhythm. RESP: Normal respiratory effort; breath sounds clear and equal bilaterally; no wheezes, rhonchi, or rales. ABD: Normal bowel sounds; non-distended; non-tender; no palpable organomegaly, no masses, no bruits. Back Exam: Normal inspection; no vertebral point tenderness, no CVA tenderness. Normal range of motion. EXT: Normal ROM in all four extremities; non-tender to palpation; no swelling or deformity; distal pulses are normal, no edema. SKIN: Warm; dry; superficial abrasion to the dorsal aspect of the left middle finger in the proximal phalanx area. NEURO:Alert and oriented x 3, coherent, SUKUMAR-XII grossly intact, sensory and motor are non-focal.        MDM  Number of Diagnoses or Management Options  Abrasion  Animal bite of finger, initial encounter  Diagnosis management comments: Assessment: Provoked animal bite to the dorsal aspect of the left middle finger without any break in the skin resulting in superficial abrasion. The patient scheduled motion and neurovascular intact. My suspicion for rabies is extremely low at this time. The patient remained hemodynamically stable and well.     Plan: Wound care/education, reassurance, symptomatic treatment/update tetanus immunization/prophylaxis antibiotic for animal bite with Augmentin/ Monitor and Reevaluate. Risk of Complications, Morbidity, and/or Mortality  Presenting problems: low  Diagnostic procedures: low  Management options: low    Patient Progress  Patient progress: stable         Procedures      Progress Note:   Pt has been reexamined by Flor Denson MD. Pt is feeling much better. Symptoms have improved. All available results have been reviewed with pt and any available family. Pt understands sx, dx, and tx in ED. Care plan has been outlined and questions have been answered. Pt is ready to go home. Will send home on animal bite and abrasion to the middle finger instruction. Prescription of naproxen and Augmentin. . Outpatient referral with PCP as needed. Written by Flor Denson MD,12:20 AM    .   .

## 2022-12-25 NOTE — ED TRIAGE NOTES
Patient reports that his cat brought in an injured squirrel where he tried to take the squirrel from the cat. The squirrel then bit through the leather glove that the patient was wearing. Patient reports that the squirrel did not draw blood.

## 2022-12-25 NOTE — ED NOTES
Pt was discharged and given instructions by Glenis De Souza MD. Pt verbalized good understanding of all discharge instructions, prescriptions and F/U care. All questions answered. Pt in stable condition on discharge.

## 2023-03-23 RX ORDER — PANTOPRAZOLE SODIUM 40 MG/1
40 TABLET, DELAYED RELEASE ORAL DAILY
COMMUNITY

## 2023-03-23 NOTE — PERIOP NOTES
[address] [place] [address] Dr Zaldivar Preprocedure Instructions      1. On the day of your surgery, please report to registration located on the 2nd floor of the  [facility]. yes    2. You must have a responsible adult to drive you to the hospital, stay at the hospital during your procedure and drive you home. If they leave your procedure will not be started (no exceptions). yes    3. Do not have anything to eat or drink (including water, gum, mints, coffee, and juice) after midnight. This does not apply to the medications you were instructed to take by your physician. yes  If you are currently taking Plavix, Coumadin, Aspirin, or other blood-thinning agents, contact your physician for special instructions. not applicable. 4. If you are having a procedure that requires bowel prep: We recommend that you have only clear liquids the day before your procedure and begin your bowel prep by 5:00 pm.  You may continue to drink clear liquids until midnight. If for any reason you are not able to complete your prep please notify your physician immediately. Yes    The patient was not aware of any bowel prep to take. I educated him that he needed to start bowel prep today and clear liquids, and that he needed to call his pharmacy to see if he had a prep that was sent in for him to start, if not then he needed to call  office and they would educate him what prep to take / order him. I gave the patient Dr. Jax Couch phone #.      5. Have a list of all current medications, including vitamins, herbal supplements and any other over the counter medications. yes    6. If you wear glasses, contacts, dentures and/or hearing aids, they may be removed prior to procedure, please bring a case to store them in. yes    7. You should understand that if you do not follow these instructions your procedure may be cancelled.   If your physical condition changes (I.e. fever, cold or flu) please contact your doctor as soon as possible. 8. It is important that you be on time.   If for any reason you are unable to keep your appointment please call [phone] the day of or your physicians office prior to your scheduled procedure

## 2023-03-24 ENCOUNTER — HOSPITAL ENCOUNTER (OUTPATIENT)
Age: 63
Setting detail: OUTPATIENT SURGERY
Discharge: HOME | End: 2023-03-24
Attending: SPECIALIST | Admitting: SPECIALIST
Payer: MEDICAID

## 2023-03-24 ENCOUNTER — ANESTHESIA EVENT (OUTPATIENT)
Dept: ENDOSCOPY | Age: 63
End: 2023-03-24
Payer: MEDICAID

## 2023-03-24 ENCOUNTER — ANESTHESIA (OUTPATIENT)
Dept: ENDOSCOPY | Age: 63
End: 2023-03-24
Payer: MEDICAID

## 2023-03-24 VITALS
HEIGHT: 69 IN | HEART RATE: 66 BPM | WEIGHT: 166.01 LBS | TEMPERATURE: 98.4 F | BODY MASS INDEX: 24.59 KG/M2 | OXYGEN SATURATION: 98 % | DIASTOLIC BLOOD PRESSURE: 73 MMHG | SYSTOLIC BLOOD PRESSURE: 102 MMHG | RESPIRATION RATE: 13 BRPM

## 2023-03-24 PROCEDURE — 74011000250 HC RX REV CODE- 250: Performed by: NURSE ANESTHETIST, CERTIFIED REGISTERED

## 2023-03-24 PROCEDURE — 74011250636 HC RX REV CODE- 250/636: Performed by: NURSE ANESTHETIST, CERTIFIED REGISTERED

## 2023-03-24 PROCEDURE — 2709999900 HC NON-CHARGEABLE SUPPLY: Performed by: SPECIALIST

## 2023-03-24 PROCEDURE — 76060000031 HC ANESTHESIA FIRST 0.5 HR: Performed by: SPECIALIST

## 2023-03-24 PROCEDURE — 76040000019: Performed by: SPECIALIST

## 2023-03-24 RX ORDER — FLUMAZENIL 0.1 MG/ML
0.2 INJECTION INTRAVENOUS
Status: DISCONTINUED | OUTPATIENT
Start: 2023-03-24 | End: 2023-03-24 | Stop reason: HOSPADM

## 2023-03-24 RX ORDER — MIDAZOLAM HYDROCHLORIDE 1 MG/ML
.25-5 INJECTION, SOLUTION INTRAMUSCULAR; INTRAVENOUS AS NEEDED
Status: DISCONTINUED | OUTPATIENT
Start: 2023-03-24 | End: 2023-03-24 | Stop reason: HOSPADM

## 2023-03-24 RX ORDER — SODIUM CHLORIDE 9 MG/ML
50 INJECTION, SOLUTION INTRAVENOUS CONTINUOUS
Status: DISCONTINUED | OUTPATIENT
Start: 2023-03-24 | End: 2023-03-24 | Stop reason: HOSPADM

## 2023-03-24 RX ORDER — NALOXONE HYDROCHLORIDE 0.4 MG/ML
0.4 INJECTION, SOLUTION INTRAMUSCULAR; INTRAVENOUS; SUBCUTANEOUS
Status: DISCONTINUED | OUTPATIENT
Start: 2023-03-24 | End: 2023-03-24 | Stop reason: HOSPADM

## 2023-03-24 RX ORDER — DEXTROMETHORPHAN/PSEUDOEPHED 2.5-7.5/.8
1.2 DROPS ORAL
Status: DISCONTINUED | OUTPATIENT
Start: 2023-03-24 | End: 2023-03-24 | Stop reason: HOSPADM

## 2023-03-24 RX ORDER — EPHEDRINE SULFATE/0.9% NACL/PF 50 MG/5 ML
SYRINGE (ML) INTRAVENOUS AS NEEDED
Status: DISCONTINUED | OUTPATIENT
Start: 2023-03-24 | End: 2023-03-24 | Stop reason: HOSPADM

## 2023-03-24 RX ORDER — FENTANYL CITRATE 50 UG/ML
25 INJECTION, SOLUTION INTRAMUSCULAR; INTRAVENOUS AS NEEDED
Status: DISCONTINUED | OUTPATIENT
Start: 2023-03-24 | End: 2023-03-24 | Stop reason: HOSPADM

## 2023-03-24 RX ORDER — PROPOFOL 10 MG/ML
INJECTION, EMULSION INTRAVENOUS AS NEEDED
Status: DISCONTINUED | OUTPATIENT
Start: 2023-03-24 | End: 2023-03-24 | Stop reason: HOSPADM

## 2023-03-24 RX ORDER — PROPOFOL 10 MG/ML
INJECTION, EMULSION INTRAVENOUS
Status: DISCONTINUED | OUTPATIENT
Start: 2023-03-24 | End: 2023-03-24 | Stop reason: HOSPADM

## 2023-03-24 RX ORDER — SODIUM CHLORIDE 9 MG/ML
INJECTION, SOLUTION INTRAVENOUS
Status: DISCONTINUED | OUTPATIENT
Start: 2023-03-24 | End: 2023-03-24 | Stop reason: HOSPADM

## 2023-03-24 RX ADMIN — PROPOFOL 175 MCG/KG/MIN: 10 INJECTION, EMULSION INTRAVENOUS at 08:21

## 2023-03-24 RX ADMIN — SODIUM CHLORIDE: 9 INJECTION, SOLUTION INTRAVENOUS at 08:16

## 2023-03-24 RX ADMIN — Medication 10 MG: at 08:31

## 2023-03-24 RX ADMIN — PROPOFOL 50 MG: 10 INJECTION, EMULSION INTRAVENOUS at 08:21

## 2023-03-24 NOTE — ANESTHESIA POSTPROCEDURE EVALUATION
Procedure(s):  COLONOSCOPY. MAC    Anesthesia Post Evaluation      Multimodal analgesia: multimodal analgesia not used between 6 hours prior to anesthesia start to PACU discharge  Patient location during evaluation: PACU  Patient participation: complete - patient participated  Level of consciousness: awake and alert  Pain score: 0  Pain management: adequate  Airway patency: patent  Anesthetic complications: no  Cardiovascular status: hemodynamically stable and acceptable  Respiratory status: acceptable  Hydration status: acceptable  Comments: Patient seen and evaluated; no concerns. Post anesthesia nausea and vomiting:  none      INITIAL Post-op Vital signs:   Vitals Value Taken Time   BP 93/66 03/24/23 0846   Temp     Pulse 64 03/24/23 0847   Resp 14 03/24/23 0847   SpO2 97 % 03/24/23 0847   Vitals shown include unvalidated device data.

## 2023-03-24 NOTE — ANESTHESIA PREPROCEDURE EVALUATION
Relevant Problems   No relevant active problems       Anesthetic History   No history of anesthetic complications            Review of Systems / Medical History  Patient summary reviewed and nursing notes reviewed    Pulmonary  Within defined limits                 Neuro/Psych         Psychiatric history    Comments: Anxiety/depression  Panic Attacks  Chronic insomnia Cardiovascular  Within defined limits                Exercise tolerance: >4 METS     GI/Hepatic/Renal  Within defined limits              Endo/Other  Within defined limits           Other Findings              Physical Exam    Airway             Cardiovascular    Rhythm: regular  Rate: normal         Dental         Pulmonary  Breath sounds clear to auscultation               Abdominal         Other Findings            Anesthetic Plan    ASA: 2  Anesthesia type: MAC            Anesthetic plan and risks discussed with: Patient      Informed consent obtained.

## 2023-03-24 NOTE — DISCHARGE INSTRUCTIONS
[address] [facility] BELLA Nieves MD  [phone]      March 24, 2023    Heather Castrejon  YOB: 1960    COLONOSCOPY DISCHARGE INSTRUCTIONS    If there is redness at IV site you should apply warm compress to area. If redness or soreness persist contact Dr. Damián Nieves' or your primary care doctor. There may be a slight amount of blood passed from the rectum. Gaseous discomfort may develop, but walking, belching will help relieve this. You may not operate a vehicle for 12 hours  You may not operate machinery or dangerous appliances for rest of today  You may not drink alcoholic beverages for 12 hours  Avoid making any critical decisions for 24 hours    DIET:  You may resume your normal diet, but some patients find that heavy or large meals may lead to indigestion or vomiting. I suggest a light meal as first food intake. MEDICATIONS:  The use of some over-the-counter pain medication may lead to bleeding after colon biopsies or polyp removal.  Tylenol (also called acetaminophen) is safe to take even if you have had colonoscopy with polyp removal.  Based on the procedure you had today you may safely take aspirin or aspirin-like products for the next ten (10) days. Remember that Tylenol (also called acetaminophen) is safe to take after colonoscopy even if you have had biopsies or polyps removed. ACTIVITY:  You may resume your normal household activities, but it is recommended that you spend the remainder of the day resting -  avoid any strenuous activity. CALL DR. Anat King' OFFICE IF:  Increasing pain, nausea, vomiting  Abdominal distension (swelling)  Significant new or increased bleeding (oral or rectal)  Fever/Chills  Chest pain/shortness of breath                       Additional instructions:   Great news, normal colonoscopy. Next colonoscopy 10 years. It was an honor to be your doctor today.   Please let me or my office staff know if you have any feedback about today's procedure. Dale Rashid MD    Colonoscopy saves lives, and can prevent colon cancer. Everyone aged 48 or older needs colonoscopy.   Tell your family and friends: get the test!

## 2023-03-24 NOTE — PERIOP NOTES
Report from [address] see anesthesia record. Abdomen remains soft, non-tender; pt denies pain. Scope pre-cleaned at bedside by [facility]. Report given to [facility] in Recovery.

## 2023-03-24 NOTE — INTERVAL H&P NOTE
Pre-Endoscopy H&P Update  Chief complaint/HPI/ROS:  The indication for the procedure, the patient's history and the patient's current medications are reviewed prior to the procedure and that data is reported on the H&P to which this document is attached. Any significant complaints with regard to organ systems will be noted, and if not mentioned then a review of systems is not contributory. Past Medical History:   Diagnosis Date    Anxiety with depression     Clinical depression     Dyspepsia     History of pneumonia 2016    Panic attacks       Past Surgical History:   Procedure Laterality Date    HX HEENT Left     left eye, small growth removal    HX ORTHOPAEDIC      disc/ neck surgery . Social   Social History     Tobacco Use    Smoking status: Never    Smokeless tobacco: Never   Substance Use Topics    Alcohol use: No      Family History   Problem Relation Age of Onset    Diabetes Mother     Hypertension Mother     Cancer Father         unknown    Cancer Brother       Allergies   Allergen Reactions    [place] Swelling    Nut [Peanut] Swelling      Prior to Admission Medications   Prescriptions Last Dose Informant Patient Reported? Taking? LORazepam (ATIVAN) 2 mg tablet 3/17/2023  No Yes   Sig: Take 1 Tab by mouth two (2) times a day. Max Daily Amount: 4 mg.   brexpiprazole (REXULTI) 1 mg tab tablet 3/24/2023 at 02:45  Yes Yes   Sig: Take 1 mg by mouth daily. desvenlafaxine succinate (PRISTIQ) 50 mg ER tablet 3/24/2023 at 02:45  Yes Yes   Sig: desvenlafaxine succinate ER 50 mg tablet,extended release 24 hr   pantoprazole (PROTONIX) 40 mg tablet 3/23/2023  Yes Yes   Sig: Take 40 mg by mouth daily. traZODone (DESYREL) 100 mg tablet 3/23/2023  No Yes   Sig: Take 1 Tab by mouth nightly. Facility-Administered Medications: None       PHYSICAL EXAM:  The patient is examined immediately prior to the procedure.   Visit Vitals  /75   Pulse 66   Temp 98.5 °F (36.9 °C)   Resp 18   Ht 5' 9\" (1.753 m)   Wt 75.3 kg (166 lb 0.1 oz)   SpO2 97%   BMI 24.51 kg/m²     Gen: Appears comfortable, no distress. Pulm: comfortable respirations with no abnormal audible breath sounds  HEART: well perfused, no abnormal audible heart sounds  GI: abdomen flat. PLAN:  Informed consent discussion held, patient afforded an opportunity to ask questions and all questions answered. After being advised of the risks, benefits, and alternatives, the patient requested that we proceed and indicated so on a written consent form. Will proceed with procedure as planned.   Maryanne Elizondo MD

## 2023-03-24 NOTE — PROCEDURES
[address] [facility] BELLA Atkins MD  [phone]      2023    Colonoscopy Procedure Note  Edward Levine  :  1960  [facility] Medical Record Number: [id number]    Indications:     Screening colonoscopy  PCP:  Yony Rojo MD  Anesthesia/Sedation: Conscious Sedation/Moderate Sedation/GETA, see notes  Endoscopist:  Dr. Kris Banks  Complications:  None  Estimated Blood Loss:  None    Permit:  The indications, risks, benefits and alternatives were reviewed with the patient or their decision maker who was provided an opportunity to ask questions and all questions were answered. The specific risks of colonoscopy with conscious sedation were reviewed, including but not limited to anesthetic complication, bleeding, adverse drug reaction, missed lesion, infection, IV site reactions, and intestinal perforation which would lead to the need for surgical repair. Alternatives to colonoscopy including radiographic imaging, observation without testing, or laboratory testing were reviewed including the limitations of those alternatives. After considering the options and having all their questions answered, the patient or their decision maker provided both verbal and written consent to proceed. Procedure in Detail:  After obtaining informed consent, positioning of the patient in the left lateral decubitus position, and conduction of a pre-procedure pause or \"time out\" the endoscope was introduced into the anus and advanced to the cecum, which was identified by the ileocecal valve and appendiceal orifice. The quality of the colonic preparation was good. A careful inspection was made as the colonoscope was withdrawn, findings and interventions are described below. Findings:   normal    Specimens:    none    Complications:   None; patient tolerated the procedure well.     Impression:  Normal colonoscopy to the cecum, with no evidence of neoplasia, diverticular disease, or mucosal abnormality. Recommendations:     - For colon cancer screening in this average-risk patient, colonoscopy may be repeated in 10 years. Thank you for entrusting me with this patient's care. Please do not hesitate to contact me with any questions or if I can be of assistance with any of your other patients' GI needs. Signed By: Ofelia Coker MD                        March 24, 2023      Surgical assistant none. Implants none unless specified.

## 2023-03-24 NOTE — PROGRESS NOTES
Endoscopy discharge instructions have been reviewed and given to patient. The patient verbalized understanding and acceptance of instructions. Dr. Jax Couch  discussed with patient procedure findings and next steps.

## 2023-03-24 NOTE — H&P
65 y.o. male for open access colonoscopy for screening   Additional data for completion of the targeted pre-endoscopy H&P will be provided under 'H&P interval notes'. Please see that document which will be attached to this.   Marlin Coleman MD

## 2023-03-24 NOTE — PROGRESS NOTES
[facility]  1960  [id number]    Situation:  Verbal report received from:   Thierry Ritter RN   Procedure: Procedure(s):  COLONOSCOPY    Background:    Preoperative diagnosis: screening colon  Postoperative diagnosis: 1. normal cln    :  Dr. Marcelino Banks  Assistant(s): Endoscopy Technician-1: Misha Ann  Endoscopy RN-1: Richardson Pulido RN    Specimens: * No specimens in log *  H. Pylori  no    Assessment:  Intra-procedure medications     Anesthesia gave intra-procedure sedation and medications, see anesthesia flow sheet yes    Intravenous fluids: NS@ KVO     Vital signs stable   yes    Abdominal assessment: round and soft   yes    Recommendation:  Discharge patient per MD order  yes.   Return to floor  outpatient  Family or Friend   friend  Permission to share finding with family or friend yes

## 2024-11-07 ENCOUNTER — HOSPITAL ENCOUNTER (EMERGENCY)
Facility: HOSPITAL | Age: 64
Discharge: ANOTHER HOSPITAL | End: 2024-11-08
Attending: EMERGENCY MEDICINE
Payer: MEDICAID

## 2024-11-07 DIAGNOSIS — T42.4X2A SUICIDE ATTEMPT BY BENZODIAZEPINE OVERDOSE (HCC): Primary | ICD-10-CM

## 2024-11-07 LAB
ALBUMIN SERPL-MCNC: 4 G/DL (ref 3.5–5)
ALBUMIN/GLOB SERPL: 0.9 (ref 1.1–2.2)
ALP SERPL-CCNC: 75 U/L (ref 45–117)
ALT SERPL-CCNC: 20 U/L (ref 12–78)
AMPHET UR QL SCN: NEGATIVE
ANION GAP SERPL CALC-SCNC: 10 MMOL/L (ref 2–12)
ANION GAP SERPL CALC-SCNC: 14 MMOL/L (ref 2–12)
ANION GAP SERPL CALC-SCNC: 17 MMOL/L (ref 2–12)
APAP SERPL-MCNC: <2 UG/ML (ref 10–30)
APPEARANCE UR: CLEAR
AST SERPL-CCNC: 21 U/L (ref 15–37)
BACTERIA URNS QL MICRO: NEGATIVE /HPF
BARBITURATES UR QL SCN: NEGATIVE
BASOPHILS # BLD: 0.1 K/UL (ref 0–0.1)
BASOPHILS NFR BLD: 1 % (ref 0–1)
BENZODIAZ UR QL: NEGATIVE
BILIRUB SERPL-MCNC: 0.7 MG/DL (ref 0.2–1)
BILIRUB UR QL: NEGATIVE
BUN SERPL-MCNC: 15 MG/DL (ref 6–20)
BUN SERPL-MCNC: 16 MG/DL (ref 6–20)
BUN SERPL-MCNC: 16 MG/DL (ref 6–20)
BUN/CREAT SERPL: 11 (ref 12–20)
BUN/CREAT SERPL: 13 (ref 12–20)
BUN/CREAT SERPL: 13 (ref 12–20)
CALCIUM SERPL-MCNC: 8.9 MG/DL (ref 8.5–10.1)
CALCIUM SERPL-MCNC: 9.1 MG/DL (ref 8.5–10.1)
CALCIUM SERPL-MCNC: 9.6 MG/DL (ref 8.5–10.1)
CANNABINOIDS UR QL SCN: POSITIVE
CHLORIDE SERPL-SCNC: 101 MMOL/L (ref 97–108)
CHLORIDE SERPL-SCNC: 105 MMOL/L (ref 97–108)
CHLORIDE SERPL-SCNC: 105 MMOL/L (ref 97–108)
CO2 SERPL-SCNC: 18 MMOL/L (ref 21–32)
CO2 SERPL-SCNC: 21 MMOL/L (ref 21–32)
CO2 SERPL-SCNC: 26 MMOL/L (ref 21–32)
COCAINE UR QL SCN: NEGATIVE
COLOR UR: ABNORMAL
COMMENT:: NORMAL
CREAT SERPL-MCNC: 1.2 MG/DL (ref 0.7–1.3)
CREAT SERPL-MCNC: 1.21 MG/DL (ref 0.7–1.3)
CREAT SERPL-MCNC: 1.47 MG/DL (ref 0.7–1.3)
DIFFERENTIAL METHOD BLD: ABNORMAL
EKG ATRIAL RATE: 105 BPM
EKG DIAGNOSIS: NORMAL
EKG P AXIS: 79 DEGREES
EKG P-R INTERVAL: 134 MS
EKG Q-T INTERVAL: 330 MS
EKG QRS DURATION: 84 MS
EKG QTC CALCULATION (BAZETT): 436 MS
EKG R AXIS: 81 DEGREES
EKG T AXIS: 72 DEGREES
EKG VENTRICULAR RATE: 105 BPM
EOSINOPHIL # BLD: 0.3 K/UL (ref 0–0.4)
EOSINOPHIL NFR BLD: 2 % (ref 0–7)
EPITH CASTS URNS QL MICRO: ABNORMAL /LPF
ERYTHROCYTE [DISTWIDTH] IN BLOOD BY AUTOMATED COUNT: 13.8 % (ref 11.5–14.5)
ETHANOL SERPL-MCNC: <10 MG/DL (ref 0–0.08)
GLOBULIN SER CALC-MCNC: 4.6 G/DL (ref 2–4)
GLUCOSE SERPL-MCNC: 149 MG/DL (ref 65–100)
GLUCOSE SERPL-MCNC: 88 MG/DL (ref 65–100)
GLUCOSE SERPL-MCNC: 91 MG/DL (ref 65–100)
GLUCOSE UR STRIP.AUTO-MCNC: NEGATIVE MG/DL
HCT VFR BLD AUTO: 44.7 % (ref 36.6–50.3)
HGB BLD-MCNC: 15.6 G/DL (ref 12.1–17)
HGB UR QL STRIP: ABNORMAL
IMM GRANULOCYTES # BLD AUTO: 0.1 K/UL (ref 0–0.04)
IMM GRANULOCYTES NFR BLD AUTO: 1 % (ref 0–0.5)
KETONES UR QL STRIP.AUTO: NEGATIVE MG/DL
LEUKOCYTE ESTERASE UR QL STRIP.AUTO: NEGATIVE
LYMPHOCYTES # BLD: 4.8 K/UL (ref 0.8–3.5)
LYMPHOCYTES NFR BLD: 43 % (ref 12–49)
Lab: ABNORMAL
MAGNESIUM SERPL-MCNC: 1.7 MG/DL (ref 1.6–2.4)
MCH RBC QN AUTO: 30.1 PG (ref 26–34)
MCHC RBC AUTO-ENTMCNC: 34.9 G/DL (ref 30–36.5)
MCV RBC AUTO: 86.1 FL (ref 80–99)
METHADONE UR QL: NEGATIVE
MONOCYTES # BLD: 0.9 K/UL (ref 0–1)
MONOCYTES NFR BLD: 8 % (ref 5–13)
NEUTS SEG # BLD: 5.1 K/UL (ref 1.8–8)
NEUTS SEG NFR BLD: 45 % (ref 32–75)
NITRITE UR QL STRIP.AUTO: NEGATIVE
NRBC # BLD: 0 K/UL (ref 0–0.01)
NRBC BLD-RTO: 0 PER 100 WBC
OPIATES UR QL: NEGATIVE
PCP UR QL: NEGATIVE
PH UR STRIP: 6 (ref 5–8)
PLATELET # BLD AUTO: 354 K/UL (ref 150–400)
PMV BLD AUTO: 8.5 FL (ref 8.9–12.9)
POTASSIUM SERPL-SCNC: 3.8 MMOL/L (ref 3.5–5.1)
POTASSIUM SERPL-SCNC: 3.8 MMOL/L (ref 3.5–5.1)
POTASSIUM SERPL-SCNC: 4.1 MMOL/L (ref 3.5–5.1)
PROT SERPL-MCNC: 8.6 G/DL (ref 6.4–8.2)
PROT UR STRIP-MCNC: NEGATIVE MG/DL
RBC # BLD AUTO: 5.19 M/UL (ref 4.1–5.7)
RBC #/AREA URNS HPF: ABNORMAL /HPF (ref 0–5)
SALICYLATES SERPL-MCNC: 2.7 MG/DL (ref 2.8–20)
SODIUM SERPL-SCNC: 136 MMOL/L (ref 136–145)
SODIUM SERPL-SCNC: 140 MMOL/L (ref 136–145)
SODIUM SERPL-SCNC: 141 MMOL/L (ref 136–145)
SP GR UR REFRACTOMETRY: 1.02 (ref 1–1.03)
SPECIMEN HOLD: NORMAL
URINE CULTURE IF INDICATED: ABNORMAL
UROBILINOGEN UR QL STRIP.AUTO: 0.2 EU/DL (ref 0.2–1)
WBC # BLD AUTO: 11.3 K/UL (ref 4.1–11.1)
WBC URNS QL MICRO: ABNORMAL /HPF (ref 0–4)

## 2024-11-07 PROCEDURE — 82077 ASSAY SPEC XCP UR&BREATH IA: CPT

## 2024-11-07 PROCEDURE — 80307 DRUG TEST PRSMV CHEM ANLYZR: CPT

## 2024-11-07 PROCEDURE — 80179 DRUG ASSAY SALICYLATE: CPT

## 2024-11-07 PROCEDURE — 85025 COMPLETE CBC W/AUTO DIFF WBC: CPT

## 2024-11-07 PROCEDURE — 2580000003 HC RX 258: Performed by: EMERGENCY MEDICINE

## 2024-11-07 PROCEDURE — 6360000002 HC RX W HCPCS: Performed by: EMERGENCY MEDICINE

## 2024-11-07 PROCEDURE — 80048 BASIC METABOLIC PNL TOTAL CA: CPT

## 2024-11-07 PROCEDURE — 81001 URINALYSIS AUTO W/SCOPE: CPT

## 2024-11-07 PROCEDURE — 80053 COMPREHEN METABOLIC PANEL: CPT

## 2024-11-07 PROCEDURE — 96361 HYDRATE IV INFUSION ADD-ON: CPT

## 2024-11-07 PROCEDURE — 80143 DRUG ASSAY ACETAMINOPHEN: CPT

## 2024-11-07 PROCEDURE — 36415 COLL VENOUS BLD VENIPUNCTURE: CPT

## 2024-11-07 PROCEDURE — 93010 ELECTROCARDIOGRAM REPORT: CPT | Performed by: SPECIALIST

## 2024-11-07 PROCEDURE — 93005 ELECTROCARDIOGRAM TRACING: CPT | Performed by: EMERGENCY MEDICINE

## 2024-11-07 PROCEDURE — 96374 THER/PROPH/DIAG INJ IV PUSH: CPT

## 2024-11-07 PROCEDURE — 6370000000 HC RX 637 (ALT 250 FOR IP): Performed by: EMERGENCY MEDICINE

## 2024-11-07 PROCEDURE — 83735 ASSAY OF MAGNESIUM: CPT

## 2024-11-07 PROCEDURE — 99285 EMERGENCY DEPT VISIT HI MDM: CPT

## 2024-11-07 RX ORDER — 0.9 % SODIUM CHLORIDE 0.9 %
1000 INTRAVENOUS SOLUTION INTRAVENOUS ONCE
Status: COMPLETED | OUTPATIENT
Start: 2024-11-07 | End: 2024-11-07

## 2024-11-07 RX ORDER — SODIUM CHLORIDE, SODIUM LACTATE, POTASSIUM CHLORIDE, AND CALCIUM CHLORIDE .6; .31; .03; .02 G/100ML; G/100ML; G/100ML; G/100ML
1000 INJECTION, SOLUTION INTRAVENOUS ONCE
Status: COMPLETED | OUTPATIENT
Start: 2024-11-07 | End: 2024-11-07

## 2024-11-07 RX ORDER — ONDANSETRON 2 MG/ML
4 INJECTION INTRAMUSCULAR; INTRAVENOUS ONCE
Status: COMPLETED | OUTPATIENT
Start: 2024-11-07 | End: 2024-11-07

## 2024-11-07 RX ORDER — ACTIVATED CHARCOAL 208 MG/ML
50 SUSPENSION ORAL ONCE
Status: COMPLETED | OUTPATIENT
Start: 2024-11-07 | End: 2024-11-07

## 2024-11-07 RX ORDER — HYDROXYZINE HYDROCHLORIDE 25 MG/1
50 TABLET, FILM COATED ORAL
Status: COMPLETED | OUTPATIENT
Start: 2024-11-08 | End: 2024-11-08

## 2024-11-07 RX ADMIN — SODIUM CHLORIDE 1000 ML: 9 INJECTION, SOLUTION INTRAVENOUS at 15:59

## 2024-11-07 RX ADMIN — ONDANSETRON 4 MG: 2 INJECTION INTRAMUSCULAR; INTRAVENOUS at 15:16

## 2024-11-07 RX ADMIN — SODIUM CHLORIDE, POTASSIUM CHLORIDE, SODIUM LACTATE AND CALCIUM CHLORIDE 1000 ML: 600; 310; 30; 20 INJECTION, SOLUTION INTRAVENOUS at 19:34

## 2024-11-07 RX ADMIN — ACTIVATED CHARCOAL 50 G: 208 SUSPENSION ORAL at 15:03

## 2024-11-07 ASSESSMENT — LIFESTYLE VARIABLES: HOW OFTEN DO YOU HAVE A DRINK CONTAINING ALCOHOL: NEVER

## 2024-11-07 NOTE — ED NOTES
Pt continuously repeating \"please let me die.\" Then became agitated attempting to pull out IV the attempted to bite the IV out of his arm. Pt was restrained from doing this by staff and police. Dr Flowers at the bedside pt placed in 4 point restraints.

## 2024-11-07 NOTE — ED NOTES
Spoke with poison control Maddy she has stated \"re eval after BMP results are back if pt wake with no signs of toxicity can consider dispo home or other per providers discretion.\"

## 2024-11-07 NOTE — ED NOTES
Dr Sanchez received verbal medical TDO from [facility].  Waiting for paperwork.   Marni  remains at bedside with security.

## 2024-11-07 NOTE — ED TRIAGE NOTES
Patient presents to treatment area via wheelchair. Patient lethargic and slow to respond. Patient states he took \"hundreds\" of lorazepam about 40 minutes PTA and then drank vinegar in an attempt to throw up. Patient states \"trump\" was the cause of the suicide attempt.

## 2024-11-07 NOTE — ED PROVIDER NOTES
[facility] EMERGENCY DEPT  EMERGENCY DEPARTMENT ENCOUNTER      Pt Name: Brad Ray  MRN: [id number]  Birthdate 1960  Date of evaluation: 11/7/2024  Provider: Kay Sanchez MD    CHIEF COMPLAINT       Chief Complaint   Patient presents with    Suicide Attempt    Drug Overdose         HISTORY OF PRESENT ILLNESS   (Location/Symptom, Timing/Onset, Context/Setting, Quality, Duration, Modifying Factors, Severity)  Note limiting factors.   Patient is a 64-year-old who comes into the emergency department with suicide attempt by lorazepam overdose.  He reports that he took the pills approximately 40 minutes ago and that he feels like he wants to die because from has been elected president.  He is not sure how much lorazepam he took but he did drink some vinegar in an attempt to make himself vomit.    The history is provided by the patient.         Review of External Medical Records:     Nursing Notes were reviewed.    REVIEW OF SYSTEMS    (2-9 systems for level 4, 10 or more for level 5)     Review of Systems    Except as noted above the remainder of the review of systems was reviewed and negative.       PAST MEDICAL HISTORY     Past Medical History:   Diagnosis Date    Anxiety with depression     Clinical depression     Dyspepsia     History of pneumonia 2016    Panic attacks          SURGICAL HISTORY       Past Surgical History:   Procedure Laterality Date    COLONOSCOPY N/A 3/24/2023    COLONOSCOPY performed by Telly Whalen MD at [facility] ENDOSCOPY    HEENT Left     left eye, small growth removal    ORTHOPEDIC SURGERY      disc/ neck surgery .         CURRENT MEDICATIONS       Previous Medications    BREXPIPRAZOLE (REXULTI) 1 MG TABS TABLET    Take 1 tablet by mouth daily    DESVENLAFAXINE SUCCINATE (PRISTIQ) 50 MG TB24 EXTENDED RELEASE TABLET    desvenlafaxine succinate ER 50 mg tablet,extended release 24 hr    LORAZEPAM (ATIVAN) 2 MG TABLET    Take 1 tablet by mouth 2 times daily.    PANTOPRAZOLE (PROTONIX) 40

## 2024-11-07 NOTE — ED NOTES
Police at bedside pt continues to be agitated asking police to \"take me to shelter.\" Pt having episodes of crying alternating with yelling.\" Pt states \"I don't want to be in a world where Ana is president.\"

## 2024-11-07 NOTE — ED NOTES
This RN spoke with ROMAN Mora, at [facility]. Per RN, pt should be observed at least 4 hour or until he has returned to baseline. RN also suggested IV fluids to treat abnormal lab work. MD made aware.

## 2024-11-07 NOTE — ED NOTES
Patient initially resistant to drinking charcoal. Explained to patient the need to drink the charcoal in order to avoid having to have an NG tube placed. Patient began drinking. Patient repeating \"I should have taken it all, I should have taken it all\".

## 2024-11-07 NOTE — ED NOTES
Pt becoming agitated at this time with Lali, RN, and student at bedside. Pt states he wants to leave and attempted to remove his own IV. IV secured. Estella, Charge RN on the phone with police. Security at bedside. Randa Barnes, ROMAN Director, aware. MD at bedside.

## 2024-11-07 NOTE — ED NOTES
Pt removed from 4 point restraints.  Pt cooperative and agreeable not to attempt to try and pull out medical equipment.  Pt continues to voice that he wants to die.  Pt has stated that he will go home and do it and not come to ED this time.  He states he came to the ED because he did not want his mother to find him.  Now he doesn't care.  States his mother is 85 years old and is going to die anyway and he hopes she dies tonight.  Pt states that he does not care if staff dies.  He wants the world to blow up.  [employer] has been present in the room when patient has repeatedly stated he wants to die and he will do it when he leaves.  [employer] are leaving the ED because we are petitioning for medical TDO.  Advised officers that if there is nothing in place at this time and if patient wants to leave we can not stop him.  We do not have a medical TDO at this time.  Advised that we can not detain a patient against their will if patient has capacity.  Advised the officers that I will remove him from his restraints that they requested that we place.   Advised that if he leaves before we get a medical TDO we will have to call them and request a paperless ECO and we can not detain him.  Dr Sanchez spoke with officers and expressed her concerns.  Officers are leaving the ED.

## 2024-11-07 NOTE — ED NOTES
Patient tearful. States he drove himself to the ED to get help because \"I don't want to leave my mother that way.\"  States repeatedly that he has nothing to live for. Endorses financial burden and hopelessness.

## 2024-11-08 VITALS
HEART RATE: 77 BPM | OXYGEN SATURATION: 96 % | BODY MASS INDEX: 19.9 KG/M2 | DIASTOLIC BLOOD PRESSURE: 71 MMHG | HEIGHT: 70 IN | RESPIRATION RATE: 16 BRPM | WEIGHT: 139 LBS | TEMPERATURE: 98.1 F | SYSTOLIC BLOOD PRESSURE: 105 MMHG

## 2024-11-08 PROCEDURE — 6370000000 HC RX 637 (ALT 250 FOR IP): Performed by: EMERGENCY MEDICINE

## 2024-11-08 RX ORDER — ZIPRASIDONE HYDROCHLORIDE 20 MG/1
20 CAPSULE ORAL ONCE
Status: COMPLETED | OUTPATIENT
Start: 2024-11-08 | End: 2024-11-08

## 2024-11-08 RX ADMIN — HYDROXYZINE HYDROCHLORIDE 50 MG: 25 TABLET, FILM COATED ORAL at 00:01

## 2024-11-08 RX ADMIN — ZIPRASIDONE HYDROCHLORIDE 20 MG: 20 CAPSULE ORAL at 15:28

## 2024-11-08 ASSESSMENT — LIFESTYLE VARIABLES
HOW OFTEN DO YOU HAVE A DRINK CONTAINING ALCOHOL: NEVER
HOW MANY STANDARD DRINKS CONTAINING ALCOHOL DO YOU HAVE ON A TYPICAL DAY: PATIENT DOES NOT DRINK

## 2024-11-08 NOTE — ED NOTES
3:12 PM   Brad Ray is a 64 y.o. male awaiting placement in a psychiatric facility with a diagnosis of   1. Suicide attempt by benzodiazepine overdose (HCC)      The patient was reexamined and remains clinically stable. All needs are being met at this time. All questions from the patient and/ or family were answered. The patient will continue to be reassessed intermittently until transfer.     Patient Vitals for the past 24 hrs:   BP Temp Temp src Pulse Resp SpO2   11/08/24 1119 (!) 94/55 98.1 °F (36.7 °C) Oral 80 14 94 %   11/08/24 0735 101/72 97.7 °F (36.5 °C) Oral 73 14 98 %   11/08/24 0200 104/76 97.7 °F (36.5 °C) Oral 68 15 99 %   11/08/24 0130 (!) 99/52 97.8 °F (36.6 °C) Oral 87 18 96 %   11/07/24 2059 120/79 -- -- 81 24 96 %   11/07/24 2015 122/78 -- -- 74 20 98 %   11/07/24 1845 (!) 109/92 -- -- 85 16 96 %   11/07/24 1830 94/62 -- -- 79 18 94 %   11/07/24 1815 112/73 -- -- 83 21 94 %   11/07/24 1800 112/76 -- -- 88 20 95 %   11/07/24 1745 99/71 -- -- 91 (!) 34 91 %   11/07/24 1730 (!) 93/59 -- -- 84 20 92 %   11/07/24 1715 112/81 -- -- 81 20 97 %   11/07/24 1700 111/76 -- -- 80 16 95 %   11/07/24 1645 109/77 -- -- 82 21 93 %   11/07/24 1630 111/80 -- -- 86 21 97 %   11/07/24 1624 120/73 -- -- 83 16 96 %   11/07/24 1615 -- -- -- 98 21 --   11/07/24 1600 -- -- -- 89 29 98 %   11/07/24 1545 (!) 139/106 -- -- (!) 110 30 --   11/07/24 1515 115/79 -- -- (!) 101 22 97 %     4:34 PM  Patient is being admitted to the hospital.  The results of their tests and reasons for their admission have been discussed with them and/or available family.  They convey agreement and understanding for the need to be admitted and for their admission diagnosis.  Patient accepted by Dr. Jasiel Hopper.    Geodon administered for agitation.    MD Daniel Rushing Imani A, MD  11/08/24 8983

## 2024-11-08 NOTE — BSMART NOTE
BSMART Note    Spoke to Tiffanie at [facility].  Patient has been declined due to acuity.    Spoke to Cici at [facility].  Patient's packet has been received, however she stated she did not know if it would be reviewed today.    Maya Edgar MA

## 2024-11-08 NOTE — BSMART NOTE
BSMART Liaison Team Note     LOS:  0     Patient goals for today:  take medications as prescribed, make needs known in an appropriate manner.  BSMART Liaison team focus/goals: assess MH needs, provide therapeutic support, brief therapy, and education, as needed.  Assist medical care management team with recommendations for coordination of care.    Progress note: Per triage, Patient presents to treatment area via wheelchair. Patient lethargic and slow to respond. Patient states he took \"hundreds\" of lorazepam about 40 minutes PTA and then drank vinegar in an attempt to throw up. Patient states \"trump\" was the cause of the suicide attempt.      Pt is alert, oriented, thoughts are concrete and linear, affect is tearful, pt is calm, cooperative, with no observed behaviors.  Pt currently denies SI/HI/AVH.  He rates his depression a 7/10 and anxiety a 6/10, today.  Pt's sleep and appetite are fine.  Pt tearfully reports he is worried about his mother, explaining he is her caregiver and is responsible for administering her insulin shots 3x daily and preparing her meals.  Pt states he is not allowed to call or see her until 11/12/24 and doesn't know how she will be able to take her insulin.  He reports being given a number to call regarding getting his mother help and states he will call them today.  Pt is willing to expand the bed search to [facility], as long as Medicaid cab will transport pt home when discharged.  Dr. Romero confirms pt is medically cleared and pt's RN, Soo, reports pt has been calm, cooperative, and appropriate, with no behavioral issues, today.  Liaison updated BSMART clinician and will continue to monitor and support, as needed.      Chart review: Per Susana CHANEY informed Ruthie attending nurse patient has pending charge for Brandishing a firearm and Domestic Assault.     Barriers to Discharge: [facility] bed  Guns in the home:  yes    Outpatient provider(s):  none reported  Insurance  info/prescription coverage:  [employer] [place] [employer]P [employer] PLUS     Diagnosis: mood d/o    Plan:   Please refer to most recent psychiatric consult note and medical team for recommendations and disposition.     Follow up Psych Consult placed? Yes .   Psychiatrist updated? No      Participating treatment team members: Brad Ray, Delores Lucero, RENETTA Lucero LCSW  BSCHI[facility] Liaison  Available on [employer]

## 2024-11-08 NOTE — ED NOTES
Pt requesting something to help him sleep.  Pt is medicated as ordered. Pt is provided with warm blanket and bed adjusted for comfort.  Waiting on BSMART for bed assignment.  Pt updated on plan.

## 2024-11-08 NOTE — ED NOTES
Bedside shift change report given to Luanne RN (oncoming nurse) by Otilio RN (offgoing nurse). Report included the following information ED SBAR, SI on 1:1 continuous observation.

## 2024-11-08 NOTE — ED NOTES
4:12 AM  Care of patient taken over from Dr Key after transfer from [facility] to [facility]; H&P reviewed, bedside handoff complete.  Awaiting placement.      7:03 AM  Change of shift.  Care of patient signed over to Dr Romero.  Bedside handoff complete. Awaiting placement.       Laith Haynes MD  11/08/24 0703

## 2024-11-08 NOTE — ED NOTES
Pt arrived from  ER. Vital signs stable.     Pt placed in green gown. Belongings collected and planned with SI cart.     Provided pt with crackers and water. Screenings complete.     1:1 sitter at bedside. Pt aware of plan of care.

## 2024-11-08 NOTE — BSMART NOTE
ADULT VOLUNTARY BED SEARCH RESUMED AT 11/8/2024:    [facility] ([facility]): at 2:37pm spoke with Maria who reported  patient has been declined at all [facility] facilities for acuity.    [facility]: patient cannot be considered until after 5pm tonight due to his attempted elopement being exclusionary per Ariana earlier this morning.    [facility] ([facility], [facility], [facility], [facility]): patient's packet has been received but has not been reviewed.    [facility]: declined this morning for acuity.    [facility]: contacted at 2:45pm spoke with Delphine shine fax clinicals.    Maya Edgar

## 2024-11-08 NOTE — BSMART NOTE
BSMART Note    Patient has been accepted to [facility] room 311-B by Dr. Jasiel Hopper.  The number for nurse report is [phone] extension [phone].    Maya Edgar MA

## 2024-11-08 NOTE — ED NOTES
ED SIGN OUT NOTE  Care assumed at [facility] 10:43 PM EST    Patient was signed out to me by Dr. Sanchez.     Patient is awaiting lab result and evaluation by psych for treatment recommendation.  The patient was reevaluated by me and appears to be stable at this time.  Vital signs reviewed.  He had no specific discomfort or chief complaint at this time..    /79   Pulse 81   Temp 97.8 °F (36.6 °C) (Oral)   Resp 24   Ht 1.778 m (5' 10\")   Wt 63 kg (139 lb)   SpO2 96%   BMI 19.94 kg/m²     Labs Reviewed   CBC WITH AUTO DIFFERENTIAL - Abnormal; Notable for the following components:       Result Value    WBC 11.3 (*)     MPV 8.5 (*)     Immature Granulocytes % 1 (*)     Lymphocytes Absolute 4.8 (*)     Immature Granulocytes Absolute 0.1 (*)     All other components within normal limits   COMPREHENSIVE METABOLIC PANEL - Abnormal; Notable for the following components:    CO2 18 (*)     Anion Gap 17 (*)     Glucose 149 (*)     Creatinine 1.47 (*)     BUN/Creatinine Ratio 11 (*)     Est, Glom Filt Rate 53 (*)     Total Protein 8.6 (*)     Globulin 4.6 (*)     Albumin/Globulin Ratio 0.9 (*)     All other components within normal limits   SALICYLATE LEVEL - Abnormal; Notable for the following components:    Salicylate Lvl 2.7 (*)     All other components within normal limits   ACETAMINOPHEN LEVEL - Abnormal; Notable for the following components:    Acetaminophen Level <2 (*)     All other components within normal limits   BASIC METABOLIC PANEL - Abnormal; Notable for the following components:    Anion Gap 14 (*)     All other components within normal limits   ETHANOL   MAGNESIUM   EXTRA TUBES HOLD   BASIC METABOLIC PANEL   URINE DRUG SCREEN     No orders to display       ED Course as of 11/07/24 2243   u Nov 07, 2024   1506 EKG 12 Lead  Sinus tachycardia, 105 bpm, normal axis, normal intervals, no STEMI, no ectopy [IO]   1525 Kay Sanchez MD has spent 45 minutes of critical care time involved

## 2024-11-08 NOTE — ED NOTES
Primary RN at bedside to reassess pt. Pt is crying stating he \"would rather be dead than do this, wants to go home, and is worried about his mom\". When asked if pt was biting others at [facility] ED he refused biting others and states he was \"trying to bite out and get rid of the IV that was in his arm\". Pt has been very calm and cooperative with primary RN and sitter. This RN doesn't have any concerns for pt being aggressive towards medical staff.

## 2024-11-08 NOTE — ED NOTES
TRANSFER - OUT REPORT:    Verbal report given to Gwen OWENS on Bradluzmaria Ray  being transferred to [facility] ED for routine progression of patient care       Report consisted of patient's Situation, Background, Assessment and   Recommendations(SBAR).     Information from the following report(s) ED Encounter Summary, MAR, and Recent Results was reviewed with the receiving nurse.    [place] Fall Assessment:    Presents to emergency department  because of falls (Syncope, seizure, or loss of consciousness): No  Age > 70: No  Altered Mental Status, Intoxication with alcohol or substance confusion (Disorientation, impaired judgment, poor safety awaremess, or inability to follow instructions): No  Impaired Mobility: Ambulates or transfers with assistive devices or assistance; Unable to ambulate or transer.: No  Nursing Judgement: No          Lines:       Opportunity for questions and clarification was provided.      Patient transported with:      [facility] transport

## 2024-11-08 NOTE — ED NOTES
Risk notified of situation with pt being a medical TDO and no longer in restraints and we will pursue a psychiatric TDO on medical clearance.

## 2024-11-08 NOTE — BSMART NOTE
BSMART assessment completed, and suicide risk level noted to be high risk. Primary Nurse  and Charge Nurse Ruthie and Physician Shahid notified. Concerns not observed.    1:1 Constant observer    Contracts for safety in hospital

## 2024-11-08 NOTE — CONSULTS
PSYCHIATRY CONSULT NOTE    REASON FOR CONSULT:  Suicide attempt    HISTORY OF PRESENTING COMPLAINT:  Brad Ray is a 64 y.o. White (non-) male who is currently admitted to the medical floor at [facility].   \"I took a bunch of pills 10-20 pills. He reports that the took these pills with the intent to end his life. He reports that he got scared and tried to thow it up.   He reports drinking vinegar and milk., he states that he threw some up in the truck.     He states alejandro the does not want to be here, he wants to go home.     He reports that \"Trump is a sickness\" , he then states \"I want to go home, I am not going to do this any more I promise\".   \"I dont want to talk about it\"    He reports that he has never had a sickness like Trump come into his life before     \"I want to go home\"      Livia Alvarado [phone]- attempted to call @ [phone] no answer, unable to leave message    PAST PSYCHIATRIC HISTORY and SUBSTANCE ABUSE HISTORY:  Psychiatric hx:       PAST MEDICAL HISTORY:    Please see H&P for details.     Past Medical History:   Diagnosis Date    Anxiety with depression     Clinical depression     Dyspepsia     History of pneumonia 2016    Panic attacks      Prior to Admission medications    Medication Sig Start Date End Date Taking? Authorizing Provider   LORazepam (ATIVAN) 2 MG tablet Take 1 tablet by mouth 2 times daily. 1/24/20  Yes Automatic Reconciliation, Ar   brexpiprazole (REXULTI) 1 MG TABS tablet Take 1 tablet by mouth daily    Automatic Reconciliation, Ar   desvenlafaxine succinate (PRISTIQ) 50 MG TB24 extended release tablet desvenlafaxine succinate ER 50 mg tablet,extended release 24 hr    Automatic Reconciliation, Ar   pantoprazole (PROTONIX) 40 MG tablet Take 1 tablet by mouth daily    Automatic Reconciliation, Ar   traZODone (DESYREL) 100 MG tablet Take 1 tablet by mouth 10/24/19   Automatic Reconciliation, Ar     [unfilled]  Lab Results   Component Value Date    WBC 11.3 (H)

## 2024-11-08 NOTE — BSMART NOTE
BSMART Note    Clinician reached out to patient's mother by phone, due to concerns based on patient reporting to staff that his mother needs his assistance with meals and insulin.  She reported that their roommate Ben is helping her with meals, and that she can do her insulin independently.  She did express concern about their cat receiving insulin, which patient usually administers twice a day.  She reported that yesterday patient did have a gun, but he never threatened her or Ben, and was only a danger to himself.  She reported that patient had been in crisis for several days, and after they got him to put down the gun patient left the home.  She was not sure whether he was going, and so she called the suicide hotline, who advised her to call police.  A female [facility] deputy came out and took a report, and she reported that she told her numerous times that patient never threatened or touched her (or roommate Ben) in any way.  She was very upset on the phone, crying and stating she was very confused when several hours later another deputy came to the residence and served her with an emergency protective order that the [facility]'s department took out.  She is not able to have contact with patient until 11/12, and she is very upset about this, because \"he didn't do anything other than want to hurt himself.\"  Roommate Ben got on the phone, also crying, and stated that the officer \"twisted everything to make it sound sinister,\" but he insisted multiple times that he was present for the entire incident, and patient never did anything threatening or aggressive towards either one of them.  He stated patient \"is a gentle person,\" and \"yesterday he was a danger to himself and nobody else.\"  They have already called the  department, [facility]'s  office, and  to complain about how yesterday was handled.    Maya Edgar MA

## 2024-11-08 NOTE — BSMART NOTE
Comprehensive Assessment Form Part 1      Section I - Disposition    Primary Diagnosis: Unspecified Mood Disorder  Major Depressive Disorder  Secondary Diagnosis:     The Medical Doctor to Psychiatrist conference was notcompleted.  The Medical Doctor is in agreement with Psychiatrist disposition because of (reason) ED provider in agreement.  The plan is admit to [facility].  The on-call Psychiatrist consulted was  .  The admitting Psychiatrist will be  .  The admitting Diagnosis is Unspecified Mood Disorder.  The Pay[employer] source is .  The name of the representative was .  This was     Section II - Integrated Summary  Summary:  Triage: Patient presents to treatment area via wheelchair. Patient lethargic and slow to respond. Patient states he took \"hundreds\" of lorazepam about 40 minutes PTA and then drank vinegar in an attempt to throw up. Patient states \"trump\" was the cause of the suicide attempt.     At bedside, patient is emotional crying during assessment, cooperative with questioning. Patient reported coming to ED after taking Lorezapam as reported 20-30 pills. Patient is prescribed the medication from his PCP. Patient reported he was suicidal at the time of ingestion and he wanted to die because \"Trump won\". Patient reported he has been having the suicidal thoughts since Monday. Patient also reported he has not been eating over the past few days because he wanted to die due to Trump. Patient denied any active thoughts at bedside and denied any plans to harm himself. Patient reported being unemployed since July and he hasnot been able to find a job when this writer asked about any changes or stressors. Patient lives with his mother. Patient does not have any mental health providers. Patient denied any family support aside from his mother. Patient reported difficulty sleeping if he does not take his Lorazepam. Patient reported cannabis use sometimes but not daily. Patient denied others substances and no illicit

## 2024-11-08 NOTE — BSMART NOTE
BSMART Note    This clinician spoke to the answering service for [place] crisis but did not receive a return phone call.  This clinician called the ER for an update who reported that a medical TDO was issued by the [facility].  This clinician offered to access patient so BSMART assessment would be completed, and was informed that this was not necessary because [place] crisis is already involved and has agreed to see the patient when he medically clears.    Maya Edgar MA

## 2024-11-08 NOTE — BSMART NOTE
BSMART Note    Clinician received phone call from ER nursing staff regarding patient.  He reported he took \"hundreds\" of lorazepam about 40 minutes PTA in a suicide attempt.  He has attempted to remove his IV and tried to leave.  Police came to the ER but stated they cannot ECO him until he is medically cleared.  Patient continues to make suicidal statements and try to leave.  Discussed options of contacting the [facility]  to request a medical TDO, or request a paper emergency custody order (ECO) for psychiatric evaluation.  This clinician will reach out to [facility] to discuss the case.    Maya Edgar MA

## 2024-11-08 NOTE — ED NOTES
12:38 PM   Brad Ray is a 64 y.o. male awaiting placement in a psychiatric facility with a diagnosis of   1. Suicide attempt by benzodiazepine overdose (HCC)    . The patient was reexamined and remains clinically stable. All needs are being met at this time. All questions from the patient and/ or family were answered. The patient will continue to be reassessed intermittently until transfer.     Patient Vitals for the past 24 hrs:   BP Temp Temp src Pulse Resp SpO2 Height Weight   11/08/24 1119 (!) 94/55 98.1 °F (36.7 °C) Oral 80 14 94 % -- --   11/08/24 0735 101/72 97.7 °F (36.5 °C) Oral 73 14 98 % -- --   11/08/24 0200 104/76 97.7 °F (36.5 °C) Oral 68 15 99 % -- --   11/08/24 0130 (!) 99/52 97.8 °F (36.6 °C) Oral 87 18 96 % -- --   11/07/24 2059 120/79 -- -- 81 24 96 % -- --   11/07/24 2015 122/78 -- -- 74 20 98 % -- --   11/07/24 1845 (!) 109/92 -- -- 85 16 96 % -- --   11/07/24 1830 94/62 -- -- 79 18 94 % -- --   11/07/24 1815 112/73 -- -- 83 21 94 % -- --   11/07/24 1800 112/76 -- -- 88 20 95 % -- --   11/07/24 1745 99/71 -- -- 91 (!) 34 91 % -- --   11/07/24 1730 (!) 93/59 -- -- 84 20 92 % -- --   11/07/24 1715 112/81 -- -- 81 20 97 % -- --   11/07/24 1700 111/76 -- -- 80 16 95 % -- --   11/07/24 1645 109/77 -- -- 82 21 93 % -- --   11/07/24 1630 111/80 -- -- 86 21 97 % -- --   11/07/24 1624 120/73 -- -- 83 16 96 % -- --   11/07/24 1615 -- -- -- 98 21 -- -- --   11/07/24 1600 -- -- -- 89 29 98 % -- --   11/07/24 1545 (!) 139/106 -- -- (!) 110 30 -- -- --   11/07/24 1515 115/79 -- -- (!) 101 22 97 % -- --   11/07/24 1500 (!) 142/110 97.8 °F (36.6 °C) Oral (!) 109 24 97 % 1.778 m (5' 10\") 63 kg (139 lb)       Patient is medically cleared for primary psychiatric admission    MD Nick Dorsey, Cale MANN MD  11/08/24 7167

## 2024-11-08 NOTE — BSMART NOTE
ADULT VOLUNTARY BED SEARCH STARTED/RESUMED AT 11/8/2024:    ETIENNE MONTE ([facility]): contacted at [phone] spoke with Duane reported Geoff for [facility], Basir for [facility] declined for admission no appropriate placement.     [facility]: contacted at 451 spoke with Ariana reported exclusionary due to due reported aggression, can be considered after 24 hours of incident    [facility] ([facility], YULIYA, CHARIT[facility], [place], [facility]): contacted at 452 spoke with Saray reported fax clinicals    [facility]: contacted at 454 spoke with Therese reported fax clinicals        Nancy Miller MA

## 2025-01-13 ENCOUNTER — TRANSCRIBE ORDERS (OUTPATIENT)
Facility: HOSPITAL | Age: 65
End: 2025-01-13

## 2025-01-13 ENCOUNTER — HOSPITAL ENCOUNTER (OUTPATIENT)
Facility: HOSPITAL | Age: 65
Discharge: HOME | End: 2025-01-16
Payer: MEDICAID

## 2025-01-13 DIAGNOSIS — M54.50 LOW BACK PAIN, UNSPECIFIED BACK PAIN LATERALITY, UNSPECIFIED CHRONICITY, UNSPECIFIED WHETHER SCIATICA PRESENT: Primary | ICD-10-CM

## 2025-01-13 DIAGNOSIS — M54.50 LOW BACK PAIN, UNSPECIFIED BACK PAIN LATERALITY, UNSPECIFIED CHRONICITY, UNSPECIFIED WHETHER SCIATICA PRESENT: ICD-10-CM

## 2025-01-13 PROCEDURE — 72100 X-RAY EXAM L-S SPINE 2/3 VWS: CPT

## (undated) DEVICE — CANN NASAL O2 CAPNOGRAPHY AD -- FILTERLINE

## (undated) DEVICE — SOLIDIFIER MEDC 1200ML -- CONVERT TO 356117

## (undated) DEVICE — ELECTRODE,RADIOTRANSLUCENT,FOAM,3PK: Brand: MEDLINE

## (undated) DEVICE — 1200 GUARD II KIT W/5MM TUBE W/O VAC TUBE: Brand: GUARDIAN

## (undated) DEVICE — CATH IV AUTOGRD BC PNK 20GA 25 -- INSYTE

## (undated) DEVICE — SIMPLICITY FLUFF UNDERPAD 23X36, MODERATE: Brand: SIMPLICITY

## (undated) DEVICE — (D)SENSOR RMFG 02 PULS OXMTR -- DISC BY MFR USE ITEM 133445

## (undated) DEVICE — BAG BELONG PT PERS CLEAR HANDL

## (undated) DEVICE — SET GRAV CK VLV NEEDLESS ST 3 GANGED 4WAY STPCOCK HI FLO 10

## (undated) DEVICE — CUFF RMFG BP INF SZ 11 DISP -- LAWSON OEM ITEM 238915

## (undated) DEVICE — 3M™ CUROS™ DISINFECTING CAP FOR NEEDLELESS CONNECTORS 270/CARTON 20 CARTONS/CASE CFF1-270: Brand: CUROS™

## (undated) DEVICE — KIT COLON DUAL END BRSH W/LUBE -- CUSTOM BX/20 FORMERLY KS-18-20

## (undated) DEVICE — Device